# Patient Record
Sex: FEMALE | Race: OTHER | HISPANIC OR LATINO | Employment: UNEMPLOYED | URBAN - METROPOLITAN AREA
[De-identification: names, ages, dates, MRNs, and addresses within clinical notes are randomized per-mention and may not be internally consistent; named-entity substitution may affect disease eponyms.]

---

## 2018-01-09 ENCOUNTER — HOSPITAL ENCOUNTER (EMERGENCY)
Facility: HOSPITAL | Age: 33
Discharge: HOME | End: 2018-01-09
Attending: EMERGENCY MEDICINE | Admitting: EMERGENCY MEDICINE

## 2018-01-09 VITALS
WEIGHT: 229.6 LBS | OXYGEN SATURATION: 100 % | RESPIRATION RATE: 18 BRPM | HEART RATE: 90 BPM | SYSTOLIC BLOOD PRESSURE: 145 MMHG | DIASTOLIC BLOOD PRESSURE: 67 MMHG | TEMPERATURE: 98.2 F

## 2018-01-09 DIAGNOSIS — R00.2 PALPITATIONS: Primary | ICD-10-CM

## 2018-01-09 LAB
ANION GAP BLD CALC-SCNC: 16 MMOL/L (ref 4–13)
BUN BLD-MCNC: 7 MG/DL (ref 5–25)
CA-I BLD-SCNC: 1.2 MMOL/L (ref 1.12–1.32)
CHLORIDE BLD-SCNC: 104 MMOL/L (ref 100–108)
CREAT BLD-MCNC: 0.8 MG/DL (ref 0.6–1.3)
GFR SERPL CREATININE-BSD FRML MDRD: 96 ML/MIN/1.73SQ M
GLUCOSE SERPL-MCNC: 104 MG/DL (ref 65–140)
HCT VFR BLD CALC: 33 % (ref 34.8–46.1)
HGB BLDA-MCNC: 11.2 G/DL (ref 11.5–15.4)
PCO2 BLD: 25 MMOL/L (ref 21–32)
POTASSIUM BLD-SCNC: 3.8 MMOL/L (ref 3.5–5.3)
SODIUM BLD-SCNC: 140 MMOL/L (ref 136–145)
SPECIMEN SOURCE: ABNORMAL

## 2018-01-09 PROCEDURE — 93005 ELECTROCARDIOGRAM TRACING: CPT

## 2018-01-09 PROCEDURE — 99285 EMERGENCY DEPT VISIT HI MDM: CPT

## 2018-01-09 PROCEDURE — 85014 HEMATOCRIT: CPT

## 2018-01-09 PROCEDURE — 80047 BASIC METABLC PNL IONIZED CA: CPT

## 2018-01-10 LAB
ATRIAL RATE: 86 BPM
P AXIS: 65 DEGREES
PR INTERVAL: 146 MS
QRS AXIS: 46 DEGREES
QRSD INTERVAL: 92 MS
QT INTERVAL: 362 MS
QTC INTERVAL: 433 MS
T WAVE AXIS: 46 DEGREES
VENTRICULAR RATE: 86 BPM

## 2018-01-10 NOTE — ED NOTES
Once patient brought to room 30, patient walked into hallway, laughing and smiling with family        Clifford Cisneros RN  01/09/18 9259

## 2018-01-10 NOTE — ED PROVIDER NOTES
History  Chief Complaint   Patient presents with    Palpitations     pt c/o heart palpatations for the past hour; pt denies any SOB; pt denies any N/V/D     Symptoms resolved  Happens intermittently  History provided by:  Patient  Palpitations   Palpitations quality:  Fast  Duration:  2 hours  Timing:  Intermittent  Progression:  Unchanged  Chronicity:  New  Context: not caffeine and not nicotine    Relieved by:  Nothing  Worsened by:  Nothing  Ineffective treatments:  None tried  Associated symptoms: no back pain, no chest pain, no cough, no diaphoresis, no dizziness, no hemoptysis, no lower extremity edema, no nausea, no numbness, no shortness of breath, no vomiting and no weakness        None       Past Medical History:   Diagnosis Date    Asthma        Past Surgical History:   Procedure Laterality Date     SECTION         History reviewed  No pertinent family history  I have reviewed and agree with the history as documented  Social History   Substance Use Topics    Smoking status: Never Smoker    Smokeless tobacco: Never Used    Alcohol use No        Review of Systems   Constitutional: Negative for chills, diaphoresis and fever  Respiratory: Negative for cough, hemoptysis, chest tightness and shortness of breath  Cardiovascular: Positive for palpitations  Negative for chest pain and leg swelling  Gastrointestinal: Negative for abdominal pain, nausea and vomiting  Musculoskeletal: Negative for back pain and neck pain  Skin: Negative for pallor  Neurological: Negative for dizziness, syncope, facial asymmetry, speech difficulty, weakness, light-headedness and numbness  All other systems reviewed and are negative        Physical Exam  ED Triage Vitals [18 2209]   Temperature Pulse Respirations Blood Pressure SpO2   98 2 °F (36 8 °C) 90 18 145/67 100 %      Temp Source Heart Rate Source Patient Position - Orthostatic VS BP Location FiO2 (%)   Oral Monitor Sitting Right arm --      Pain Score       6           Orthostatic Vital Signs  Vitals:    01/09/18 2209   BP: 145/67   Pulse: 90   Patient Position - Orthostatic VS: Sitting       Physical Exam   Constitutional: She is oriented to person, place, and time  She appears well-developed and well-nourished  Non-toxic appearance  She does not have a sickly appearance  She does not appear ill  No distress  Laughing with the others in her room   HENT:   Head: Normocephalic and atraumatic  Mouth/Throat: Oropharynx is clear and moist    Eyes: EOM are normal  Pupils are equal, round, and reactive to light  Neck: Normal range of motion  Neck supple  No JVD present  Cardiovascular: Normal rate, regular rhythm, S1 normal, S2 normal, normal heart sounds, intact distal pulses and normal pulses  Exam reveals no gallop and no friction rub  No murmur heard  Pulmonary/Chest: Effort normal and breath sounds normal  No respiratory distress  She has no wheezes  She has no rales  She exhibits no tenderness  Abdominal: Soft  Bowel sounds are normal  She exhibits no distension  There is no tenderness  There is no rebound and no guarding  Neurological: She is alert and oriented to person, place, and time  She has normal strength  No cranial nerve deficit or sensory deficit  Skin: Skin is warm and dry  No rash noted  She is not diaphoretic  No pallor  Nursing note and vitals reviewed        ED Medications  Medications - No data to display    Diagnostic Studies  Results Reviewed     Procedure Component Value Units Date/Time    POCT Chem 8+ [[id number]]  (Abnormal) Collected:  01/09/18 2234    Lab Status:  Final result Updated:  01/09/18 2239     SODIUM, I-STAT 140 mmol/l      Potassium, i-STAT 3 8 mmol/L      Chloride, istat 104 mmol/L      CO2, i-STAT 25 mmol/L      Anion Gap, Istat 16 (H) mmol/L      Calcium, Ionized i-STAT 1 20 mmol/L      BUN, I-STAT 7 mg/dl      Creatinine, i-STAT 0 8 mg/dl      eGFR 96 ml/min/1 73sq m      Glucose, i-STAT 104 mg/dl      Hct, i-STAT 33 (L) %      Hgb, i-STAT 11 2 (L) g/dl      Specimen Type VENOUS                 No orders to display              Procedures  ECG 12 Lead Documentation  Date/Time: 1/9/2018 10:23 PM  Performed by: Jcarlos Lundberg by: Glen Mayes     Indications / Diagnosis:  Palpitations  ECG reviewed by me, the ED Provider: yes    Patient location:  Bedside  Rate:     ECG rate:  86    ECG rate assessment: normal    Rhythm:     Rhythm: sinus rhythm    Ectopy:     Ectopy: PAC    QRS:     QRS axis:  Normal    QRS intervals:  Normal  ST segments:     ST segments:  Normal  T waves:     T waves: normal             Phone Contacts  ED Phone Contact    ED Course  ED Course                                MDM  Number of Diagnoses or Management Options  Palpitations:   Diagnosis management comments: Palpitations - Will check EKG to assess arrhythmia, istat to r/o lyte abnormality  Amount and/or Complexity of Data Reviewed  Tests in the medicine section of CPT®: ordered and reviewed      CritCare Time    Disposition  Final diagnoses:   Palpitations     Time reflects when diagnosis was documented in both MDM as applicable and the Disposition within this note     Time User Action Codes Description Comment    1/9/2018 10:40 PM Rishabh Kingston 48 [R00 2] Palpitations       ED Disposition     ED Disposition Condition Comment    Discharge  Di Colon discharge to home/self care  Condition at discharge: Good        Follow-up Information     Follow up With Specialties Details Why [address]    [address]  [phone]          Patient's Medications    No medications on file     No discharge procedures on file      ED Provider  Electronically Signed by           Bharti Bellamy DO  01/09/18 4667

## 2018-01-10 NOTE — DISCHARGE INSTRUCTIONS
Palpitaciones cardíacas   LO QUE NECESITA SABER:   Las palpitaciones cardíacas son sensaciones que se perciben wes aceleraciones, chris, latidos o aleteos del corazón  También podría sentir latidos adicionales, que [place] corazón no late por un corto periodo de tiempo o que se [employer] latidos  Puede percibir estas sensaciones en el pecho, la garganta o el vishal  Pueden presentarse cuando está sentado, de pie o acostado  Las palpitaciones cardíacas pueden causar temor; sin embargo, generalmente no se deben a un problema importante  INSTRUCCIONES SOBRE [facility]:   Llame al 911 o pídale a alguien más que llame en cualquiera de los siguientes casos:   · Usted tiene alguno de los siguientes signos de un ataque cardíaco:      ¨ Estornudos, presión, o dolor en [place] pecho que dura mas de 5 minutos o regresa  ¨ Malestar o dolor en [place] espalda, vishal, mandíbula, abdomen, o brazo     ¨ Dificultad para respirar    ¨ Náuseas o vómito    ¨ Siente un desvanecimiento o tiene sudores fríos especialmente en el pecho o dificultad para respirar  · Usted tiene alguno de los siguientes signos de derrame cerebral:      ¨ Adormecimiento o caída de un lado de [place] mainor     ¨ Debilidad en un brazo o claude pierna    ¨ Confusión o debilidad para hablar    ¨ Mareos o dolor de tanvi intenso, o pérdida de la visión  · Usted se desmaya o pierde el conocimiento  Regrese a la paige de emergencias si:   · Angel palpitaciones ocurren con más frecuencia o son más intensas  Pregúntele a [place] Roshan Freed vitaminas y minerales son adecuados para usted  · Usted tiene nueva inflamación en angel pies o tobillos o esta ha empeorado  · Usted tiene preguntas o inquietudes acerca de [place] condición o cuidado  Acuda a angel consultas de control con [place] médico según le indicaron  Es posible que tenga que programar claude elma de seguimiento con un cardiólogo   Tang vez deba hacerse exámenes para determinar si sufre problemas cardíacos que le causan las palpitaciones  Anote angel preguntas para que se acuerde de hacerlas torsten angel visitas  Mantenga un registro:  Mariano cuándo angel palpitaciones comienzan y terminan, qué estaba usted haciendo cuando comenzaron y angel síntomas  Mantenga un registro de lo que usted comió o tomó torsten las horas antes de angel palpitaciones  Incluya todo lo que aparentemente le ayudó a que angel síntomas mejoraran wes acostarse o contener [place] respiración  [employer] ayudará a usted y a [place] médico a saber qué le provoca las palpitaciones  Lleve el registro con usted a angel citas de seguimiento  Ayude a evitar las palpitaciones cardíacas:   · Controle el estrés y la ansiedad  Encuentre formas de [place], wes escuchar música, meditar o hacer yoga  El ejercicio también puede ayudar a disminuir el estrés y la ansiedad  Hable con [place] persona de confianza sobre el estrés o la ansiedad que padece  También puede hablar con un psicoterapeuta  · Duerma lo suficiente cada la noche  Pregunte a [place] médico cuánto debería dormir usted cada noche  · No tome bebidas con cafeína o alcohol   Manuel Larsen y el alcohol pueden hacer que angel palpitaciones empeoren  Los refrescos, el café, el té, el chocolate y las bebidas energizantes contienen cafeína  · No fume  La nicotina y otros químicos en los cigarrillos y cigarros podrían provocar daño a [place] Janas Gosselin y a angel vasos sanguíneos  Pida información a [place] médico si usted actualmente fuma y necesita ayuda para dejar de fumar  Los cigarrillos electrónicos o tabaco sin humo todavía contienen nicotina  Consulte con [place] médico antes de [employer]  · No use drogas ilegales  Hable con [place] médico si consume drogas ilegales y Ileene Eileen  © 2017 [address] [employer] Information is for End User's use only and may not be sold, redistributed or otherwise used for commercial purposes   All illustrations and images included in CareNotes® are the copyrighted property of A  D A M , Daksha Ma  Esta información es sólo para uso en educación  [place] intención no es darle un consejo médico sobre enfermedades o tratamientos  Colsulte con [place] Gaylyn Harsh farmacéutico antes de seguir cualquier régimen médico para saber si es seguro y efectivo para usted

## 2019-01-03 ENCOUNTER — HOSPITAL ENCOUNTER (EMERGENCY)
Facility: HOSPITAL | Age: 34
Discharge: HOME | End: 2019-01-03
Attending: EMERGENCY MEDICINE | Admitting: EMERGENCY MEDICINE

## 2019-01-03 VITALS
OXYGEN SATURATION: 100 % | TEMPERATURE: 98 F | DIASTOLIC BLOOD PRESSURE: 73 MMHG | SYSTOLIC BLOOD PRESSURE: 134 MMHG | RESPIRATION RATE: 17 BRPM | WEIGHT: 226.4 LBS | HEART RATE: 89 BPM

## 2019-01-03 DIAGNOSIS — J06.9 UPPER RESPIRATORY INFECTION: ICD-10-CM

## 2019-01-03 DIAGNOSIS — L30.9 DERMATITIS: Primary | ICD-10-CM

## 2019-01-03 PROCEDURE — 99282 EMERGENCY DEPT VISIT SF MDM: CPT

## 2019-01-03 RX ORDER — SOFT LENS DISINFECTANT
SOLUTION, NON-ORAL MISCELLANEOUS AS NEEDED
Qty: 1 EACH | Refills: 0 | Status: SHIPPED | OUTPATIENT
Start: 2019-01-03 | End: 2020-06-26 | Stop reason: ALTCHOICE

## 2019-01-03 RX ORDER — ALBUTEROL SULFATE 90 UG/1
2 AEROSOL, METERED RESPIRATORY (INHALATION) EVERY 4 HOURS PRN
Qty: 1 INHALER | Refills: 0 | Status: SHIPPED | OUTPATIENT
Start: 2019-01-03 | End: 2020-06-26 | Stop reason: ALTCHOICE

## 2019-01-03 RX ORDER — DIAPER,BRIEF,INFANT-TODD,DISP
EACH MISCELLANEOUS 2 TIMES DAILY
Qty: 30 G | Refills: 0 | Status: SHIPPED | OUTPATIENT
Start: 2019-01-03 | End: 2019-04-22

## 2019-01-03 NOTE — ED PROVIDER NOTES
History  Chief Complaint   Patient presents with    Nasal Congestion     started today with cough and nasal congestion  Son and daughter sick with similar s/s  Tylenol "this morning"  Diffuse rash to bilateral arms and right side of neck  Pruritic  No airway compromise    Rash     43-year-old female with history of asthma, presents for evaluation of mild, productive cough and nasal congestion for the past 2 days  Patient reports that she has been around her kids who also have similar symptoms  Patient reports that she also feels chest tightness on occasion  States that she has taken Tylenol without much relief as well as Benadryl  Patient states that she also has been noticing a rash over her bilateral forearms and neck for the past few days  States that she is unsure if it is due to 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  She denies any new soaps, lotions, detergents, foods, environmental or animal exposures  She has not applied anything to the area  Denies any fever, chills, nausea vomiting, difficulty breathing, chest pain or shortness of breath  Patient is not a smoker  None       Past Medical History:   Diagnosis Date    Asthma        Past Surgical History:   Procedure Laterality Date     SECTION         History reviewed  No pertinent family history  I have reviewed and agree with the history as documented  Social History   Substance Use Topics    Smoking status: Never Smoker    Smokeless tobacco: Never Used    Alcohol use No        Review of Systems   Constitutional: Negative for chills and fever  HENT: Positive for congestion  Negative for ear discharge, ear pain, sinus pressure and sore throat  Respiratory: Positive for cough and chest tightness  Negative for shortness of breath and wheezing  Cardiovascular: Negative for chest pain  Gastrointestinal: Negative for abdominal pain, constipation, diarrhea, nausea and vomiting  Musculoskeletal: Negative for myalgias     Skin: Positive for rash  Physical Exam  Physical Exam   Constitutional: She is oriented to person, place, and time  She appears well-developed and well-nourished  She is cooperative  No distress  HENT:   Head: Normocephalic and atraumatic  Right Ear: External ear normal    Left Ear: External ear normal    Mouth/Throat: Oropharynx is clear and moist  No oropharyngeal exudate  Eyes: Pupils are equal, round, and reactive to light  Conjunctivae and EOM are normal    Neck: Normal range of motion  Neck supple  Cardiovascular: Normal rate and normal heart sounds  No murmur heard  Pulmonary/Chest: Effort normal and breath sounds normal  No respiratory distress  She has no rhonchi  She has no rales  She exhibits no tenderness  Musculoskeletal: Normal range of motion  Neurological: She is alert and oriented to person, place, and time  Skin: Skin is warm  Rash noted  She is not diaphoretic  No erythema  Red macular raised rash noted over antecubital region bilaterally  Macular rash noted over right neck region  No vesicles or pustules  No drainage from rash  Psychiatric: She has a normal mood and affect  Nursing note and vitals reviewed        Vital Signs  ED Triage Vitals [01/03/19 1222]   Temperature Pulse Respirations Blood Pressure SpO2   98 °F (36 7 °C) 89 17 134/73 100 %      Temp src Heart Rate Source Patient Position - Orthostatic VS BP Location FiO2 (%)   -- Monitor -- -- --      Pain Score       No Pain           Vitals:    01/03/19 1222   BP: 134/73   Pulse: 89       Visual Acuity      ED Medications  Medications - No data to display    Diagnostic Studies  Results Reviewed     None                 No orders to display              Procedures  Procedures       Phone Contacts  ED Phone Contact    ED Course                               MDM  Number of Diagnoses or Management Options  Dermatitis:   Upper respiratory infection:   Diagnosis management comments: 49-year-old female presents for evaluation of nasal congestion, cough and a rash  Will advise supportive care for cough and nasal congestion and given hydrocortisone cream for the rash  Advised follow up with family care provider if symptoms do not improve  CritCare Time    Disposition  Final diagnoses:   Dermatitis   Upper respiratory infection     Time reflects when diagnosis was documented in both MDM as applicable and the Disposition within this note     Time User Action Codes Description Comment    1/3/2019  1:35 PM Tomi Moe Add [L30 9] Dermatitis     1/3/2019  1:36 PM Tomi Moe Add [J06 9] Upper respiratory infection       ED Disposition     ED Disposition Condition Comment    Discharge  [facility] discharge to home/self care  Condition at discharge: Stable        Follow-up Information     Follow up With Specialties Details Why [address] Family Medicine Schedule an appointment as soon as possible for a visit Follow up for further evaluation [phone] [address] [phone]            Discharge Medication List as of 1/3/2019  1:39 PM      START taking these medications    Details   albuterol (PROVENTIL HFA,VENTOLIN HFA) 90 mcg/act inhaler Inhale 2 puffs every 4 (four) hours as needed for wheezing, Starting Thu 1/3/2019, Print      hydrocortisone 1 % ointment Apply topically 2 (two) times a day for 14 days, Starting Thu 1/3/2019, Until Thu 1/17/2019, Print      Respiratory Therapy Supplies (NEBULIZER) Dorothe Earing by Does not apply route as needed (wheezing and shortness of breath), Starting Thu 1/3/2019, Print           No discharge procedures on file      ED Provider  Electronically Signed by           Elpidio Gomez PA-C  01/04/19 7146

## 2019-01-03 NOTE — DISCHARGE INSTRUCTIONS
Infección respiratoria superior   LO QUE NECESITA SABER:   Angelica infección respiratoria superior también se conoce wes el resfriado común  Melanie es angelica infección que puede afectar [place] nariz, garganta, oídos y los senos frontales  Para personas saludables, el resfriado común generalmente no es grave y no requiere tratamiento especial  Los síntomas del resfriado generalmente son [place] graves torsten los primeros 3 a 5 días  [address] en 7 a 14 días  Puede que usted siga tosiendo por 2 a 3 semanas  Los resfriados son provocados por virus y no mejoran con antibióticos  INSTRUCCIONES SOBRE [facility]:   Busque atención médica de inmediato si:   · Usted tiene dolor torácico y dificultad para respirar  Pregúntele a [place] Chinyere Pel vitaminas y minerales son adecuados para usted  · Usted tiene fiebre de más de 102ºF [facility])  · S[place]land garganta irritada empeora o usted ve manchas hilario o sandor en [place] garganta  · Silva síntomas empeoran después de 3 a 5 días o [place]land resfriado no mejora en 14 días  · Usted tiene sarpullido en alguna parte de [place] piel  · Usted tiene bultos grandes y sensible en s[place] vishal    · Usted tiene secreción espesa de color glenis o amarillo proveniente de [place] nariz  · Usted expectora mucosidad de color amarillo, glenis o con [place]  · Usted vomita por más de 24 horas y no puede retener líquidos en el estómago  · Usted tiene un grave dolor de oído  · Usted tiene preguntas o inquietudes acerca de [place] condición o cuidado  Medicamentos:  Es posible que usted necesite alguno de los siguientes:  · Los descongestionantes  ayudan a reducir la congestión nasal y Shelby Ganser a respirar más fácilmente  Si charlie pastillas descongestionantes, pueden causarle agitación o problemas para dormir  No use aerosol descongestionante por más de unos cuantos días  · Los jarabes para la tos  ayudan a reducir la tos   Pregúntele a [place] médico cuál tipo de medicamento para la tos es mejor para usted  · AINEs (Analgésicos antiinflamatorios no esteroides) wes el ibuprofeno, ayudan a disminuir la inflamación, el dolor y la [place]  Los AINEs pueden causar sangrado estomacal o problemas renales en ciertas personas  Si usted charlie un medicamento anticoagulante, siempre pregúntele a [place] médico si los LOWELL son seguros para usted  Siempre ginette la etiqueta de susan medicamento y [place] instrucciones  · Acetaminofeno:  kit el dolor y baja la fiebre  Está disponible sin receta médica  Pregunte la cantidad y la frecuencia con que debe tomarlos  [facility] 645  Ginette las etiquetas de todos los demás medicamentos que esté usando para saber si también contienen acetaminofén, o pregunte a [place] médico o farmacéutico  El acetaminofén puede causar daño en el hígado cuando no se charlie de forma correcta  No use más de 4 gramos (4000 miligramos) en total de acetaminofeno en un día  · [place] angel medicamentos wes se le haya indicado  Consulte con [place] médico si usted jagdeep que [place] medicamento no le está ayudando o si presenta efectos secundarios  Infórmele si es alérgico a cualquier medicamento  Mantenga claude lista actualizada de los [place], las vitaminas y los productos herbales que charlie  Incluya los siguientes datos de los medicamentos: cantidad, frecuencia y motivo de administración  Traiga con usted la lista o los envases de la píldoras a angel citas de seguimiento  Lleve la lista de los medicamentos con usted en kellee de claude emergencia  Acuda a angel consultas de control con [place] médico según le indicaron  Anote angel preguntas para que se acuerde de hacerlas torsten angel visitas  Cuidados personales:   · Descanse el mayor tiempo posible  Empiece a hacer un poco más día a día  · [employer] líquidos wes se le indique  Los líquidos le ayudarán a aflojar y disolver la mucosidad para que la pueda expectorar  Los líquidos ayudarán a evitar la deshidratación   Los líquidos que ayudan a prevenir la deshidratación pueden ser [place], jugo de fruta y caldo  No tome líquidos que contienen cafeína  La cafeína puede aumentar el riesgo de deshidratación  Pregúntele a [place] médico cuál es la cantidad de líquido que necesita ingerir a diario  · Alivie el dolor de garganta  Maggie gárgaras de agua tibia con sal  [place] ayuda a aliviar el dolor de garganta  Prepare agua salina disolviendo ¼ de cucharada de sal a 1 taza de agua tibia  Usted puede también chupar dulces duros o pastillas para la garganta  Usted puede usar aerosol para el dolor de garganta  · Use un humidificador o vaporizador  Use un humidificador de vivian frío o un vaporizador para elevar la humedad en [place] casa  [place] podría ayudarle a respirar más fácilmente y al mismo tiempo disminuir la tos  · Use gotas nasales de solución salina wes se le haya indicado  Estas ayudan a [place]  · Aplique vaselina en la parte externa alrededor de las fosas nasales  Esta puede disminuir la irritación de sonarse la nariz  · No fume  La nicotina y otros químicos en los cigarrillos y cigarros pueden empeorar angel síntomas  También pueden causar infecciones wes la bronquitis o la neumonía  Pida información a [place] médico si usted actualmente fuma y necesita ayuda para dejar de fumar  Los cigarrillos electrónicos o tabaco sin humo todavía contienen nicotina  Consulte con s[place] médico antes de [employer]  Evite transmitir [place]land resfriado a los demás:   · Trate de mantenerse alejado de otras personas torsten los primeros 2 a 3 días de [place] resfriado cuando éste es más fácil de transmitir  · No comparta alimentos o bebidas  · No comparta toallas de mano con otros miembros de olive en el hogar  · [employer] frecuentemente, especialmente después de sonarse la nariz  Bharat la espalda a otras personas y cúbrase la boca y la nariz con un pañuelo desechable cuando estornuda o tose         © 2017 [address] [employer] Information is for End User's use only and may not be sold, redistributed or otherwise used for commercial purposes  All illustrations and images included in CareNotes® are the copyrighted property of A CHENG A M , Inc  or Zac Ma  Esta información es sólo para uso en educación  Vo intención no es darle un consejo médico sobre enfermedades o tratamientos  Colsulte con vo Verlon Tae farmacéutico antes de seguir cualquier régimen médico para saber si es seguro y efectivo para usted  Dermatitis   LO QUE NECESITA SABER:   La dermatitis es claude inflamación cutánea  Usted puede tener un sarpullido con picor, enrojecimiento o inflamación  Podría también tener protuberancias o ampollas que elisha costra o supuran un líquido lam  La dermatitis puede ser causada por alérgenos wes ácaros, caspa de los [place], polen y ciertos alimentos  También se puede desarrollar cuando algo entra en contacto con la piel y la irrita o provoca claude reacción alérgica  Unos ejemplos son [place] Mace, látex y la hiedra venenosa  Maria M Ghazi [facility]:   Llame al 911 en kellee de presentar alguno de los siguientes síntomas de anafilaxia:   · Dificultad repentina para respirar    · Inflamación y estrechez en la garganta    · Mareos, desvanecimientos, desmayos o confusión  Regrese a la paige de emergencias si:   · Usted tiene fiebre o nota edd rojizas subiendo por vo Vonita Handing  · Vo sarpullido se ve más inflamado, romano o caliente  Pregúntele a vo Chinyere Pel vitaminas y minerales son adecuados para usted  · Las ampollas en vo piel supuran un líquido linares o amarillento o le sale claude secreción maloliente  · Vo sarpullido se propaga o no mejora aún después del tratamiento  · Usted tiene preguntas o inquietudes acerca de vo condición o cuidado  Medicamentos:   · Medicamentos,  ayudan a disminuir el sarpullido y la inflamación o para tratar la infección bacteriana   Los [employer] ser administrados en crema tópica, inyección o píldora  · [place] angel medicamentos wes se le haya indicado  Consulte con vo médico si usted jagdeep que vo medicamento no le está ayudando o si presenta efectos secundarios  Infórmele si es alérgico a algún medicamento  Mantenga claude lista actualizada de los [place], las vitaminas y los productos herbales que charlie  Incluya los siguientes datos de los medicamentos: cantidad, frecuencia y motivo de administración  Traiga con usted la lista o los envases de la píldoras a angel citas de seguimiento  Lleve la lista de los medicamentos con usted en kellee de claude emergencia  Aplique claude compresa fría a vo sarpullido:  [place] ayudará a aliviar vo piel  Mantenga vo piel húmeda:  Frote crema o loción sin perfume en vo piel para impedir la resequedad y comezón  Maggie esto tan pronto termine de bañarse o ducharse con agua templada cuando vo piel aún esté mojada  Evite los irritantes de la piel:  No use productos que le irriten la piel, wes el [place], productos para el BRIAN, jabones y limpiadores  Use productos que no contengan perfume ni tintes  Acuda a angel consultas de control con vo médico según le indicaron  Anote angel preguntas para que se acuerde de hacerlas torsten angel visitas  © 2017 [address] [employer] Information is for End User's use only and may not be sold, redistributed or otherwise used for commercial purposes  All illustrations and images included in CareNotes® are the copyrighted property of A D A M , Inc  or Zac Ma  Esta información es sólo para uso en educación  Vo intención no es darle un consejo médico sobre enfermedades o tratamientos  Colsulte con vo Gaylyn Harsh farmacéutico antes de seguir cualquier régimen médico para saber si es seguro y efectivo para usted

## 2019-04-22 ENCOUNTER — HOSPITAL ENCOUNTER (EMERGENCY)
Facility: HOSPITAL | Age: 34
Discharge: HOME | End: 2019-04-22
Attending: EMERGENCY MEDICINE

## 2019-04-22 VITALS
SYSTOLIC BLOOD PRESSURE: 132 MMHG | RESPIRATION RATE: 20 BRPM | TEMPERATURE: 98 F | WEIGHT: 229.28 LBS | HEART RATE: 98 BPM | DIASTOLIC BLOOD PRESSURE: 84 MMHG | OXYGEN SATURATION: 99 %

## 2019-04-22 DIAGNOSIS — R68.89 FLU-LIKE SYMPTOMS: Primary | ICD-10-CM

## 2019-04-22 PROCEDURE — 99283 EMERGENCY DEPT VISIT LOW MDM: CPT | Performed by: EMERGENCY MEDICINE

## 2019-04-22 PROCEDURE — 99283 EMERGENCY DEPT VISIT LOW MDM: CPT

## 2019-04-22 RX ORDER — GUAIFENESIN 100 MG/5ML
400 SYRUP ORAL 3 TIMES DAILY PRN
Qty: 120 ML | Refills: 0 | Status: SHIPPED | OUTPATIENT
Start: 2019-04-22 | End: 2019-05-02

## 2019-04-22 RX ORDER — ONDANSETRON 4 MG/1
4 TABLET, ORALLY DISINTEGRATING ORAL EVERY 8 HOURS PRN
Qty: 20 TABLET | Refills: 0 | Status: SHIPPED | OUTPATIENT
Start: 2019-04-22 | End: 2020-06-26 | Stop reason: ALTCHOICE

## 2019-04-22 RX ORDER — IBUPROFEN 600 MG/1
600 TABLET ORAL EVERY 6 HOURS PRN
Qty: 30 TABLET | Refills: 0 | Status: SHIPPED | OUTPATIENT
Start: 2019-04-22 | End: 2020-06-26 | Stop reason: ALTCHOICE

## 2020-06-26 ENCOUNTER — HOSPITAL ENCOUNTER (EMERGENCY)
Facility: HOSPITAL | Age: 35
Discharge: HOME | End: 2020-06-26
Attending: EMERGENCY MEDICINE | Admitting: EMERGENCY MEDICINE

## 2020-06-26 ENCOUNTER — APPOINTMENT (EMERGENCY)
Dept: CT IMAGING | Facility: HOSPITAL | Age: 35
End: 2020-06-26

## 2020-06-26 VITALS
TEMPERATURE: 98.5 F | RESPIRATION RATE: 16 BRPM | HEART RATE: 76 BPM | OXYGEN SATURATION: 99 % | WEIGHT: 250.22 LBS | SYSTOLIC BLOOD PRESSURE: 122 MMHG | DIASTOLIC BLOOD PRESSURE: 58 MMHG

## 2020-06-26 DIAGNOSIS — N20.0 KIDNEY STONE ON LEFT SIDE: Primary | ICD-10-CM

## 2020-06-26 LAB
ANION GAP SERPL CALCULATED.3IONS-SCNC: 9 MMOL/L (ref 4–13)
BACTERIA UR QL AUTO: ABNORMAL /HPF
BASOPHILS # BLD AUTO: 0.02 THOUSANDS/ΜL (ref 0–0.1)
BASOPHILS NFR BLD AUTO: 0 % (ref 0–1)
BILIRUB UR QL STRIP: NEGATIVE
BUN SERPL-MCNC: 10 MG/DL (ref 5–25)
CALCIUM SERPL-MCNC: 8.6 MG/DL (ref 8.3–10.1)
CHLORIDE SERPL-SCNC: 103 MMOL/L (ref 100–108)
CLARITY UR: ABNORMAL
CO2 SERPL-SCNC: 25 MMOL/L (ref 21–32)
COLOR UR: YELLOW
CREAT SERPL-MCNC: 0.91 MG/DL (ref 0.6–1.3)
EOSINOPHIL # BLD AUTO: 0 THOUSAND/ΜL (ref 0–0.61)
EOSINOPHIL NFR BLD AUTO: 0 % (ref 0–6)
ERYTHROCYTE [DISTWIDTH] IN BLOOD BY AUTOMATED COUNT: 19.9 % (ref 11.6–15.1)
EXT PREG TEST URINE: NEGATIVE
EXT. CONTROL ED NAV: NORMAL
GFR SERPL CREATININE-BSD FRML MDRD: 81 ML/MIN/1.73SQ M
GLUCOSE SERPL-MCNC: 124 MG/DL (ref 65–140)
GLUCOSE UR STRIP-MCNC: NEGATIVE MG/DL
HCT VFR BLD AUTO: 31.3 % (ref 34.8–46.1)
HGB BLD-MCNC: 9 G/DL (ref 11.5–15.4)
HGB UR QL STRIP.AUTO: ABNORMAL
IMM GRANULOCYTES # BLD AUTO: 0.01 THOUSAND/UL (ref 0–0.2)
IMM GRANULOCYTES NFR BLD AUTO: 0 % (ref 0–2)
KETONES UR STRIP-MCNC: NEGATIVE MG/DL
LEUKOCYTE ESTERASE UR QL STRIP: NEGATIVE
LYMPHOCYTES # BLD AUTO: 1.27 THOUSANDS/ΜL (ref 0.6–4.47)
LYMPHOCYTES NFR BLD AUTO: 21 % (ref 14–44)
MCH RBC QN AUTO: 20.5 PG (ref 26.8–34.3)
MCHC RBC AUTO-ENTMCNC: 28.8 G/DL (ref 31.4–37.4)
MCV RBC AUTO: 71 FL (ref 82–98)
MONOCYTES # BLD AUTO: 0.24 THOUSAND/ΜL (ref 0.17–1.22)
MONOCYTES NFR BLD AUTO: 4 % (ref 4–12)
NEUTROPHILS # BLD AUTO: 4.55 THOUSANDS/ΜL (ref 1.85–7.62)
NEUTS SEG NFR BLD AUTO: 75 % (ref 43–75)
NITRITE UR QL STRIP: NEGATIVE
NON-SQ EPI CELLS URNS QL MICRO: ABNORMAL /HPF
NRBC BLD AUTO-RTO: 0 /100 WBCS
PH UR STRIP.AUTO: 6 [PH] (ref 4.5–8)
PLATELET # BLD AUTO: 282 THOUSANDS/UL (ref 149–390)
PMV BLD AUTO: 10.9 FL (ref 8.9–12.7)
POTASSIUM SERPL-SCNC: 3.6 MMOL/L (ref 3.5–5.3)
PROT UR STRIP-MCNC: NEGATIVE MG/DL
RBC # BLD AUTO: 4.4 MILLION/UL (ref 3.81–5.12)
RBC #/AREA URNS AUTO: ABNORMAL /HPF
SODIUM SERPL-SCNC: 137 MMOL/L (ref 136–145)
SP GR UR STRIP.AUTO: >=1.03 (ref 1–1.03)
UROBILINOGEN UR QL STRIP.AUTO: 0.2 E.U./DL
WBC # BLD AUTO: 6.09 THOUSAND/UL (ref 4.31–10.16)
WBC #/AREA URNS AUTO: ABNORMAL /HPF

## 2020-06-26 PROCEDURE — 36415 COLL VENOUS BLD VENIPUNCTURE: CPT | Performed by: EMERGENCY MEDICINE

## 2020-06-26 PROCEDURE — 99284 EMERGENCY DEPT VISIT MOD MDM: CPT | Performed by: EMERGENCY MEDICINE

## 2020-06-26 PROCEDURE — 96361 HYDRATE IV INFUSION ADD-ON: CPT

## 2020-06-26 PROCEDURE — 74176 CT ABD & PELVIS W/O CONTRAST: CPT

## 2020-06-26 PROCEDURE — 99284 EMERGENCY DEPT VISIT MOD MDM: CPT

## 2020-06-26 PROCEDURE — 96375 TX/PRO/DX INJ NEW DRUG ADDON: CPT

## 2020-06-26 PROCEDURE — 80048 BASIC METABOLIC PNL TOTAL CA: CPT | Performed by: EMERGENCY MEDICINE

## 2020-06-26 PROCEDURE — 96374 THER/PROPH/DIAG INJ IV PUSH: CPT

## 2020-06-26 PROCEDURE — 81025 URINE PREGNANCY TEST: CPT | Performed by: EMERGENCY MEDICINE

## 2020-06-26 PROCEDURE — 85025 COMPLETE CBC W/AUTO DIFF WBC: CPT | Performed by: EMERGENCY MEDICINE

## 2020-06-26 PROCEDURE — 81001 URINALYSIS AUTO W/SCOPE: CPT

## 2020-06-26 RX ORDER — ONDANSETRON 4 MG/1
4 TABLET, ORALLY DISINTEGRATING ORAL EVERY 8 HOURS PRN
Qty: 10 TABLET | Refills: 0 | Status: SHIPPED | OUTPATIENT
Start: 2020-06-26 | End: 2020-08-30 | Stop reason: ALTCHOICE

## 2020-06-26 RX ORDER — KETOROLAC TROMETHAMINE 30 MG/ML
15 INJECTION, SOLUTION INTRAMUSCULAR; INTRAVENOUS ONCE
Status: COMPLETED | OUTPATIENT
Start: 2020-06-26 | End: 2020-06-26

## 2020-06-26 RX ORDER — ONDANSETRON 2 MG/ML
4 INJECTION INTRAMUSCULAR; INTRAVENOUS ONCE
Status: COMPLETED | OUTPATIENT
Start: 2020-06-26 | End: 2020-06-26

## 2020-06-26 RX ORDER — OXYCODONE HYDROCHLORIDE AND ACETAMINOPHEN 5; 325 MG/1; MG/1
1 TABLET ORAL EVERY 6 HOURS PRN
Qty: 8 TABLET | Refills: 0 | Status: SHIPPED | OUTPATIENT
Start: 2020-06-26 | End: 2020-07-06

## 2020-06-26 RX ORDER — TAMSULOSIN HYDROCHLORIDE 0.4 MG/1
0.4 CAPSULE ORAL
Qty: 10 CAPSULE | Refills: 0 | Status: SHIPPED | OUTPATIENT
Start: 2020-06-26 | End: 2020-08-30 | Stop reason: ALTCHOICE

## 2020-06-26 RX ORDER — IBUPROFEN 600 MG/1
600 TABLET ORAL EVERY 6 HOURS PRN
Qty: 30 TABLET | Refills: 0 | Status: SHIPPED | OUTPATIENT
Start: 2020-06-26 | End: 2020-08-30 | Stop reason: ALTCHOICE

## 2020-06-26 RX ADMIN — SODIUM CHLORIDE 1000 ML: 0.9 INJECTION, SOLUTION INTRAVENOUS at 04:14

## 2020-06-26 RX ADMIN — KETOROLAC TROMETHAMINE 15 MG: 30 INJECTION, SOLUTION INTRAMUSCULAR at 04:14

## 2020-06-26 RX ADMIN — ONDANSETRON 4 MG: 2 INJECTION INTRAMUSCULAR; INTRAVENOUS at 04:15

## 2020-08-30 ENCOUNTER — ANESTHESIA (OUTPATIENT)
Dept: PERIOP | Facility: HOSPITAL | Age: 35
End: 2020-08-30
Payer: COMMERCIAL

## 2020-08-30 ENCOUNTER — HOSPITAL ENCOUNTER (OUTPATIENT)
Facility: HOSPITAL | Age: 35
Setting detail: OBSERVATION
Discharge: HOME | End: 2020-08-31
Attending: EMERGENCY MEDICINE | Admitting: INTERNAL MEDICINE
Payer: COMMERCIAL

## 2020-08-30 ENCOUNTER — APPOINTMENT (OUTPATIENT)
Dept: RADIOLOGY | Facility: HOSPITAL | Age: 35
End: 2020-08-30
Payer: COMMERCIAL

## 2020-08-30 ENCOUNTER — ANESTHESIA EVENT (OUTPATIENT)
Dept: PERIOP | Facility: HOSPITAL | Age: 35
End: 2020-08-30
Payer: COMMERCIAL

## 2020-08-30 ENCOUNTER — PREP FOR PROCEDURE (OUTPATIENT)
Dept: UROLOGY | Facility: CLINIC | Age: 35
End: 2020-08-30

## 2020-08-30 ENCOUNTER — TELEPHONE (OUTPATIENT)
Dept: UROLOGY | Facility: CLINIC | Age: 35
End: 2020-08-30

## 2020-08-30 ENCOUNTER — APPOINTMENT (EMERGENCY)
Dept: CT IMAGING | Facility: HOSPITAL | Age: 35
End: 2020-08-30
Payer: COMMERCIAL

## 2020-08-30 DIAGNOSIS — N20.0 NEPHROLITHIASIS: Primary | ICD-10-CM

## 2020-08-30 DIAGNOSIS — N23 RENAL COLIC ON LEFT SIDE: ICD-10-CM

## 2020-08-30 DIAGNOSIS — N20.0 KIDNEY STONE: ICD-10-CM

## 2020-08-30 DIAGNOSIS — N12 PYELONEPHRITIS: ICD-10-CM

## 2020-08-30 DIAGNOSIS — N13.2 HYDRONEPHROSIS WITH URINARY OBSTRUCTION DUE TO URETERAL CALCULUS: ICD-10-CM

## 2020-08-30 DIAGNOSIS — E66.01 MORBID OBESITY (HCC): ICD-10-CM

## 2020-08-30 DIAGNOSIS — Z46.6 ENCOUNTER FOR ADJUSTMENT OF URETERAL STENT: ICD-10-CM

## 2020-08-30 DIAGNOSIS — N39.0 UTI (URINARY TRACT INFECTION): Primary | ICD-10-CM

## 2020-08-30 DIAGNOSIS — D64.9 ANEMIA: ICD-10-CM

## 2020-08-30 LAB
ALBUMIN SERPL BCP-MCNC: 2.9 G/DL (ref 3.5–5)
ALP SERPL-CCNC: 82 U/L (ref 46–116)
ALT SERPL W P-5'-P-CCNC: 26 U/L (ref 12–78)
ANION GAP SERPL CALCULATED.3IONS-SCNC: 5 MMOL/L (ref 4–13)
AST SERPL W P-5'-P-CCNC: 14 U/L (ref 5–45)
BACTERIA UR QL AUTO: ABNORMAL /HPF
BASOPHILS # BLD AUTO: 0.02 THOUSANDS/ΜL (ref 0–0.1)
BASOPHILS NFR BLD AUTO: 0 % (ref 0–1)
BILIRUB SERPL-MCNC: 0.16 MG/DL (ref 0.2–1)
BILIRUB UR QL STRIP: NEGATIVE
BUN SERPL-MCNC: 13 MG/DL (ref 5–25)
CALCIUM SERPL-MCNC: 8.7 MG/DL (ref 8.3–10.1)
CHLORIDE SERPL-SCNC: 103 MMOL/L (ref 100–108)
CLARITY UR: ABNORMAL
CO2 SERPL-SCNC: 30 MMOL/L (ref 21–32)
COLOR UR: YELLOW
CREAT SERPL-MCNC: 1.09 MG/DL (ref 0.6–1.3)
EOSINOPHIL # BLD AUTO: 0 THOUSAND/ΜL (ref 0–0.61)
EOSINOPHIL NFR BLD AUTO: 0 % (ref 0–6)
ERYTHROCYTE [DISTWIDTH] IN BLOOD BY AUTOMATED COUNT: 18.8 % (ref 11.6–15.1)
EXT PREG TEST URINE: NEGATIVE
EXT. CONTROL ED NAV: NORMAL
GFR SERPL CREATININE-BSD FRML MDRD: 65 ML/MIN/1.73SQ M
GLUCOSE SERPL-MCNC: 103 MG/DL (ref 65–140)
GLUCOSE UR STRIP-MCNC: NEGATIVE MG/DL
HCT VFR BLD AUTO: 28.5 % (ref 34.8–46.1)
HGB BLD-MCNC: 8.1 G/DL (ref 11.5–15.4)
HGB UR QL STRIP.AUTO: ABNORMAL
IMM GRANULOCYTES # BLD AUTO: 0.03 THOUSAND/UL (ref 0–0.2)
IMM GRANULOCYTES NFR BLD AUTO: 1 % (ref 0–2)
KETONES UR STRIP-MCNC: NEGATIVE MG/DL
LEUKOCYTE ESTERASE UR QL STRIP: ABNORMAL
LYMPHOCYTES # BLD AUTO: 1.7 THOUSANDS/ΜL (ref 0.6–4.47)
LYMPHOCYTES NFR BLD AUTO: 29 % (ref 14–44)
MCH RBC QN AUTO: 19.9 PG (ref 26.8–34.3)
MCHC RBC AUTO-ENTMCNC: 28.4 G/DL (ref 31.4–37.4)
MCV RBC AUTO: 70 FL (ref 82–98)
MONOCYTES # BLD AUTO: 0.4 THOUSAND/ΜL (ref 0.17–1.22)
MONOCYTES NFR BLD AUTO: 7 % (ref 4–12)
NEUTROPHILS # BLD AUTO: 3.7 THOUSANDS/ΜL (ref 1.85–7.62)
NEUTS SEG NFR BLD AUTO: 63 % (ref 43–75)
NITRITE UR QL STRIP: POSITIVE
NON-SQ EPI CELLS URNS QL MICRO: ABNORMAL /HPF
NRBC BLD AUTO-RTO: 0 /100 WBCS
PH UR STRIP.AUTO: 7 [PH] (ref 4.5–8)
PLATELET # BLD AUTO: 255 THOUSANDS/UL (ref 149–390)
PMV BLD AUTO: 10.3 FL (ref 8.9–12.7)
POTASSIUM SERPL-SCNC: 3.6 MMOL/L (ref 3.5–5.3)
PROT SERPL-MCNC: 7.4 G/DL (ref 6.4–8.2)
PROT UR STRIP-MCNC: ABNORMAL MG/DL
RBC # BLD AUTO: 4.08 MILLION/UL (ref 3.81–5.12)
RBC #/AREA URNS AUTO: ABNORMAL /HPF
SODIUM SERPL-SCNC: 138 MMOL/L (ref 136–145)
SP GR UR STRIP.AUTO: 1.02 (ref 1–1.03)
UROBILINOGEN UR QL STRIP.AUTO: 1 E.U./DL
WBC # BLD AUTO: 5.85 THOUSAND/UL (ref 4.31–10.16)
WBC #/AREA URNS AUTO: ABNORMAL /HPF

## 2020-08-30 PROCEDURE — 87077 CULTURE AEROBIC IDENTIFY: CPT | Performed by: UROLOGY

## 2020-08-30 PROCEDURE — 52332 CYSTOSCOPY AND TREATMENT: CPT | Performed by: UROLOGY

## 2020-08-30 PROCEDURE — 87086 URINE CULTURE/COLONY COUNT: CPT

## 2020-08-30 PROCEDURE — 96361 HYDRATE IV INFUSION ADD-ON: CPT

## 2020-08-30 PROCEDURE — 80053 COMPREHEN METABOLIC PANEL: CPT | Performed by: NURSE PRACTITIONER

## 2020-08-30 PROCEDURE — 85025 COMPLETE CBC W/AUTO DIFF WBC: CPT | Performed by: NURSE PRACTITIONER

## 2020-08-30 PROCEDURE — 99220 PR INITIAL OBSERVATION CARE/DAY 70 MINUTES: CPT | Performed by: INTERNAL MEDICINE

## 2020-08-30 PROCEDURE — 87186 SC STD MICRODIL/AGAR DIL: CPT | Performed by: UROLOGY

## 2020-08-30 PROCEDURE — 81001 URINALYSIS AUTO W/SCOPE: CPT

## 2020-08-30 PROCEDURE — C1769 GUIDE WIRE: HCPCS | Performed by: UROLOGY

## 2020-08-30 PROCEDURE — 74420 UROGRAPHY RTRGR +-KUB: CPT

## 2020-08-30 PROCEDURE — 99285 EMERGENCY DEPT VISIT HI MDM: CPT

## 2020-08-30 PROCEDURE — 81025 URINE PREGNANCY TEST: CPT | Performed by: NURSE PRACTITIONER

## 2020-08-30 PROCEDURE — 87086 URINE CULTURE/COLONY COUNT: CPT | Performed by: UROLOGY

## 2020-08-30 PROCEDURE — 96374 THER/PROPH/DIAG INJ IV PUSH: CPT

## 2020-08-30 PROCEDURE — 96376 TX/PRO/DX INJ SAME DRUG ADON: CPT

## 2020-08-30 PROCEDURE — 74176 CT ABD & PELVIS W/O CONTRAST: CPT

## 2020-08-30 PROCEDURE — 96375 TX/PRO/DX INJ NEW DRUG ADDON: CPT

## 2020-08-30 PROCEDURE — G1004 CDSM NDSC: HCPCS

## 2020-08-30 PROCEDURE — 99285 EMERGENCY DEPT VISIT HI MDM: CPT | Performed by: NURSE PRACTITIONER

## 2020-08-30 PROCEDURE — 99245 OFF/OP CONSLTJ NEW/EST HI 55: CPT | Performed by: UROLOGY

## 2020-08-30 PROCEDURE — 36415 COLL VENOUS BLD VENIPUNCTURE: CPT | Performed by: NURSE PRACTITIONER

## 2020-08-30 PROCEDURE — C2617 STENT, NON-COR, TEM W/O DEL: HCPCS | Performed by: UROLOGY

## 2020-08-30 DEVICE — INLAY OPTIMA URETERAL STENT W/O GUIDEWIRE
Type: IMPLANTABLE DEVICE | Site: URETER | Status: NON-FUNCTIONAL
Brand: BARD® INLAY OPTIMA® URETERAL STENT
Removed: 2020-09-29

## 2020-08-30 RX ORDER — GABAPENTIN 300 MG/1
300 CAPSULE ORAL ONCE
Status: CANCELLED | OUTPATIENT
Start: 2020-09-29 | End: 2020-08-30

## 2020-08-30 RX ORDER — FENTANYL CITRATE/PF 50 MCG/ML
50 SYRINGE (ML) INJECTION
Status: DISCONTINUED | OUTPATIENT
Start: 2020-08-30 | End: 2020-08-30 | Stop reason: HOSPADM

## 2020-08-30 RX ORDER — HYDROMORPHONE HCL/PF 1 MG/ML
0.2 SYRINGE (ML) INJECTION EVERY 4 HOURS PRN
Status: DISCONTINUED | OUTPATIENT
Start: 2020-08-30 | End: 2020-08-31 | Stop reason: HOSPADM

## 2020-08-30 RX ORDER — ONDANSETRON 2 MG/ML
4 INJECTION INTRAMUSCULAR; INTRAVENOUS ONCE
Status: COMPLETED | OUTPATIENT
Start: 2020-08-30 | End: 2020-08-30

## 2020-08-30 RX ORDER — ONDANSETRON 4 MG/1
4 TABLET, ORALLY DISINTEGRATING ORAL EVERY 6 HOURS PRN
Qty: 20 TABLET | Refills: 0 | Status: SHIPPED | OUTPATIENT
Start: 2020-08-30 | End: 2020-08-31 | Stop reason: SDUPTHER

## 2020-08-30 RX ORDER — SULFAMETHOXAZOLE AND TRIMETHOPRIM 800; 160 MG/1; MG/1
1 TABLET ORAL EVERY 12 HOURS SCHEDULED
Qty: 10 TABLET | Refills: 0 | Status: SHIPPED | OUTPATIENT
Start: 2020-08-30 | End: 2020-08-31 | Stop reason: SDUPTHER

## 2020-08-30 RX ORDER — HYDROMORPHONE HCL/PF 1 MG/ML
1 SYRINGE (ML) INJECTION ONCE
Status: COMPLETED | OUTPATIENT
Start: 2020-08-30 | End: 2020-08-30

## 2020-08-30 RX ORDER — FENTANYL CITRATE 50 UG/ML
INJECTION, SOLUTION INTRAMUSCULAR; INTRAVENOUS AS NEEDED
Status: DISCONTINUED | OUTPATIENT
Start: 2020-08-30 | End: 2020-08-30

## 2020-08-30 RX ORDER — ACETAMINOPHEN 325 MG/1
975 TABLET ORAL ONCE
Status: CANCELLED | OUTPATIENT
Start: 2020-09-29 | End: 2020-08-30

## 2020-08-30 RX ORDER — LIDOCAINE HYDROCHLORIDE 10 MG/ML
INJECTION, SOLUTION EPIDURAL; INFILTRATION; INTRACAUDAL; PERINEURAL AS NEEDED
Status: DISCONTINUED | OUTPATIENT
Start: 2020-08-30 | End: 2020-08-30

## 2020-08-30 RX ORDER — OXYCODONE HYDROCHLORIDE 5 MG/1
5 TABLET ORAL EVERY 4 HOURS PRN
Status: DISCONTINUED | OUTPATIENT
Start: 2020-08-30 | End: 2020-08-31 | Stop reason: HOSPADM

## 2020-08-30 RX ORDER — MAGNESIUM HYDROXIDE 1200 MG/15ML
LIQUID ORAL AS NEEDED
Status: DISCONTINUED | OUTPATIENT
Start: 2020-08-30 | End: 2020-08-30 | Stop reason: HOSPADM

## 2020-08-30 RX ORDER — TAMSULOSIN HYDROCHLORIDE 0.4 MG/1
0.4 CAPSULE ORAL
Qty: 30 CAPSULE | Refills: 0 | Status: SHIPPED | OUTPATIENT
Start: 2020-08-30 | End: 2020-08-31 | Stop reason: SDUPTHER

## 2020-08-30 RX ORDER — DOCUSATE SODIUM 100 MG/1
100 CAPSULE, LIQUID FILLED ORAL 3 TIMES DAILY
Qty: 90 CAPSULE | Refills: 0 | Status: SHIPPED | OUTPATIENT
Start: 2020-08-30 | End: 2020-08-31 | Stop reason: SDUPTHER

## 2020-08-30 RX ORDER — ONDANSETRON 2 MG/ML
4 INJECTION INTRAMUSCULAR; INTRAVENOUS EVERY 6 HOURS PRN
Status: DISCONTINUED | OUTPATIENT
Start: 2020-08-30 | End: 2020-08-31 | Stop reason: HOSPADM

## 2020-08-30 RX ORDER — SODIUM CHLORIDE 9 MG/ML
125 INJECTION, SOLUTION INTRAVENOUS CONTINUOUS
Status: DISCONTINUED | OUTPATIENT
Start: 2020-08-30 | End: 2020-08-30

## 2020-08-30 RX ORDER — OXYCODONE HYDROCHLORIDE 10 MG/1
10 TABLET ORAL EVERY 4 HOURS PRN
Status: DISCONTINUED | OUTPATIENT
Start: 2020-08-30 | End: 2020-08-31 | Stop reason: HOSPADM

## 2020-08-30 RX ORDER — ONDANSETRON 2 MG/ML
4 INJECTION INTRAMUSCULAR; INTRAVENOUS ONCE AS NEEDED
Status: DISCONTINUED | OUTPATIENT
Start: 2020-08-30 | End: 2020-08-30 | Stop reason: HOSPADM

## 2020-08-30 RX ORDER — ONDANSETRON 2 MG/ML
INJECTION INTRAMUSCULAR; INTRAVENOUS AS NEEDED
Status: DISCONTINUED | OUTPATIENT
Start: 2020-08-30 | End: 2020-08-30

## 2020-08-30 RX ORDER — OXYCODONE HYDROCHLORIDE AND ACETAMINOPHEN 5; 325 MG/1; MG/1
1 TABLET ORAL EVERY 4 HOURS PRN
Qty: 8 TABLET | Refills: 0 | Status: SHIPPED | OUTPATIENT
Start: 2020-08-30 | End: 2020-08-31 | Stop reason: SDUPTHER

## 2020-08-30 RX ORDER — OXYBUTYNIN CHLORIDE 5 MG/1
5 TABLET ORAL 2 TIMES DAILY PRN
Qty: 60 TABLET | Refills: 0 | Status: SHIPPED | OUTPATIENT
Start: 2020-08-30 | End: 2020-08-31 | Stop reason: SDUPTHER

## 2020-08-30 RX ORDER — SODIUM CHLORIDE 9 MG/ML
100 INJECTION, SOLUTION INTRAVENOUS CONTINUOUS
Status: DISCONTINUED | OUTPATIENT
Start: 2020-08-30 | End: 2020-08-31

## 2020-08-30 RX ORDER — CALCIUM CARBONATE 200(500)MG
500 TABLET,CHEWABLE ORAL 3 TIMES DAILY PRN
Status: DISCONTINUED | OUTPATIENT
Start: 2020-08-30 | End: 2020-08-31 | Stop reason: HOSPADM

## 2020-08-30 RX ORDER — HYDROMORPHONE HCL/PF 1 MG/ML
0.5 SYRINGE (ML) INJECTION
Status: DISCONTINUED | OUTPATIENT
Start: 2020-08-30 | End: 2020-08-30 | Stop reason: HOSPADM

## 2020-08-30 RX ORDER — KETOROLAC TROMETHAMINE 30 MG/ML
15 INJECTION, SOLUTION INTRAMUSCULAR; INTRAVENOUS ONCE
Status: COMPLETED | OUTPATIENT
Start: 2020-08-30 | End: 2020-08-30

## 2020-08-30 RX ORDER — TAMSULOSIN HYDROCHLORIDE 0.4 MG/1
0.4 CAPSULE ORAL
Status: DISCONTINUED | OUTPATIENT
Start: 2020-08-30 | End: 2020-08-31 | Stop reason: HOSPADM

## 2020-08-30 RX ORDER — MIDAZOLAM HYDROCHLORIDE 2 MG/2ML
INJECTION, SOLUTION INTRAMUSCULAR; INTRAVENOUS AS NEEDED
Status: DISCONTINUED | OUTPATIENT
Start: 2020-08-30 | End: 2020-08-30

## 2020-08-30 RX ORDER — PROPOFOL 10 MG/ML
INJECTION, EMULSION INTRAVENOUS AS NEEDED
Status: DISCONTINUED | OUTPATIENT
Start: 2020-08-30 | End: 2020-08-30

## 2020-08-30 RX ORDER — CEFAZOLIN SODIUM 2 G/50ML
2000 SOLUTION INTRAVENOUS ONCE
Status: CANCELLED | OUTPATIENT
Start: 2020-09-29 | End: 2020-08-30

## 2020-08-30 RX ORDER — MEPERIDINE HYDROCHLORIDE 25 MG/ML
12.5 INJECTION INTRAMUSCULAR; INTRAVENOUS; SUBCUTANEOUS ONCE AS NEEDED
Status: DISCONTINUED | OUTPATIENT
Start: 2020-08-30 | End: 2020-08-30 | Stop reason: HOSPADM

## 2020-08-30 RX ORDER — DEXAMETHASONE SODIUM PHOSPHATE 10 MG/ML
INJECTION, SOLUTION INTRAMUSCULAR; INTRAVENOUS AS NEEDED
Status: DISCONTINUED | OUTPATIENT
Start: 2020-08-30 | End: 2020-08-30

## 2020-08-30 RX ADMIN — ONDANSETRON 4 MG: 2 INJECTION INTRAMUSCULAR; INTRAVENOUS at 08:28

## 2020-08-30 RX ADMIN — SODIUM CHLORIDE 125 ML/HR: 0.9 INJECTION, SOLUTION INTRAVENOUS at 04:52

## 2020-08-30 RX ADMIN — OXYCODONE HYDROCHLORIDE 10 MG: 10 TABLET ORAL at 20:47

## 2020-08-30 RX ADMIN — ONDANSETRON 4 MG: 2 INJECTION INTRAMUSCULAR; INTRAVENOUS at 03:07

## 2020-08-30 RX ADMIN — ONDANSETRON 4 MG: 2 INJECTION INTRAMUSCULAR; INTRAVENOUS at 04:04

## 2020-08-30 RX ADMIN — DEXAMETHASONE SODIUM PHOSPHATE 8 MG: 10 INJECTION, SOLUTION INTRAMUSCULAR; INTRAVENOUS at 08:28

## 2020-08-30 RX ADMIN — FAMOTIDINE 20 MG: 10 INJECTION, SOLUTION INTRAVENOUS at 20:47

## 2020-08-30 RX ADMIN — TAMSULOSIN HYDROCHLORIDE 0.4 MG: 0.4 CAPSULE ORAL at 16:31

## 2020-08-30 RX ADMIN — CEFTRIAXONE SODIUM 1000 MG: 10 INJECTION, POWDER, FOR SOLUTION INTRAVENOUS at 04:10

## 2020-08-30 RX ADMIN — SODIUM CHLORIDE 1000 ML: 0.9 INJECTION, SOLUTION INTRAVENOUS at 03:03

## 2020-08-30 RX ADMIN — SODIUM CHLORIDE 100 ML/HR: 0.9 INJECTION, SOLUTION INTRAVENOUS at 05:45

## 2020-08-30 RX ADMIN — CALCIUM CARBONATE (ANTACID) CHEW TAB 500 MG 500 MG: 500 CHEW TAB at 20:47

## 2020-08-30 RX ADMIN — KETOROLAC TROMETHAMINE 15 MG: 30 INJECTION, SOLUTION INTRAMUSCULAR at 03:07

## 2020-08-30 RX ADMIN — FENTANYL CITRATE 50 MCG: 50 INJECTION, SOLUTION INTRAMUSCULAR; INTRAVENOUS at 08:29

## 2020-08-30 RX ADMIN — PROPOFOL 200 MG: 10 INJECTION, EMULSION INTRAVENOUS at 08:28

## 2020-08-30 RX ADMIN — MIDAZOLAM 2 MG: 1 INJECTION INTRAMUSCULAR; INTRAVENOUS at 08:24

## 2020-08-30 RX ADMIN — HYDROMORPHONE HYDROCHLORIDE 1 MG: 1 INJECTION, SOLUTION INTRAMUSCULAR; INTRAVENOUS; SUBCUTANEOUS at 04:06

## 2020-08-30 RX ADMIN — LIDOCAINE HYDROCHLORIDE 100 MG: 10 INJECTION, SOLUTION EPIDURAL; INFILTRATION; INTRACAUDAL; PERINEURAL at 08:28

## 2020-08-30 NOTE — TELEPHONE ENCOUNTER
Our office will add the patient to the stent tracking database, we have also placed orders for her subsequent stone surgery via ureteroscopy and laser lithotripsy

## 2020-08-30 NOTE — DISCHARGE INSTRUCTIONS
Colocación de catéter ureteral   LO QUE NECESITA SABER:     [place] le hemos puesto un tubito de plastico adentro del rinon suzanne que se llama un stent ureteral  Melanie aparato ayuda a drenar el rinon y resolver infeccion  Usted necesita otra cirugia en el futuro que se llama ureteroscopy para quitar la ly el ureter (el tubito de musculo que drena el rinon)  No podemos quitar la ly cuando la paciente tiene infeccion (wes en el kellee suyo)  Mi oficina va a arreglar todo en cuando a encontrar claude fecha por la proxima cirugia, suele sere 4-6 semanas despues de poner el stent ureteral  Suele ser normal tener urgencia y frecuencia para orinar y algo de hayden en la orina con un stent  Le he recetado medicamentos para suavizar el proceso de [place]  Si tiene preguntas o problemas, no dude en [address] ([address] [facility] en [facility])  Dr Jonathan Dewey colocación del catéter ureteral es un procedimiento que se realiza para abrir un uréter angosto o bloqueado  El uréter es el tubo que transporta la orina desde el Ervin Muse a la vejiga  El catéter es un tubo plástico day hueco que se [place] para mantener abierto el uréter y así permitir el flujo de [place]  El catéter podría permanecer instalado por varias semanas  INSTRUCCIONES SOBRE [facility]:   Medicamentos:   · Los analgésicos  podrían administrarse para quitar o disminuir el dolor  No espere a que el dolor sea muy intenso para richa el medicamento  · Antibióticos  ayudan a prevenir infecciones  Vo médico podría recetarle antibióticos mientras tenga colocado el catéter  · [place] angel medicamentos wes se le haya indicado  Consulte con vo médico si usted jagdeep que vo medicamento no le está ayudando o si presenta efectos secundarios  Infórmele si es alérgico a cualquier medicamento   Mantenga claude lista actualizada de los [place], las vitaminas y los productos herbales que charlie  Incluya los siguientes datos de los medicamentos: cantidad, frecuencia y motivo de administración  Traiga con usted la lista o los envases de la píldoras a angel citas de seguimiento  Lleve la lista de los medicamentos con usted en kellee de claude emergencia  Programe claude elma con [place] urólogo wes se le indique:  Usted necesitará citas de seguimiento regulares con [place] urólogo torsten el tiempo que tenga puesto el catéter  El urólogo lo revisará y se asegurará de que el catéter esté funcionando correctamente  Es probable que le lin algunas pruebas de [facility] para osman si hay infección  Anote angel preguntas para que se acuerde de hacerlas torsten angel visitas  Cuidados personales:   · [address]  Pregunte a [place] médico sobre la cantidad de líquido que necesita richa todos los días y cuáles le recomienda  Bebidas wes jugos de arándanos o [place] podrían ser de mucha utilidad en la prevención de infecciones urinarias  · Regrese a angel actividades regulares  el día después de que le coloquen el catéter o según las indicaciones de [place] médico      · Usted puede bañarse  el día después de colocado el catéter, si [place] médico lo autoriza a hacerlo  Comuníquese con s[place] médico o urólogo si:   · Usted tiene fiebre o escalofríos  · Usted siente que necesita orinar con frecuencia  · Usted tiene dolor cuando orina o dolor alrededor de la vejiga o el [place]  · Usted nota hayden en la orina o la [place] se ve turbia  · Usted tiene preguntas o inquietudes acerca de [place] condición o cuidado  Busque atención médica de inmediato o llame al 911 si:   · Usted orina poco o no orina nada  · Usted tiene dolor abdominal intenso  © 2017 [address] [employer] Information is for End User's use only and may not be sold, redistributed or otherwise used for commercial purposes   All illustrations and images included in CareNotes® are the copyrighted property of A D A M , Inc  or [employer]  Esta información es sólo para uso en educación  [place] intención no es darle un consejo médico sobre enfermedades o tratamientos  Colsulte con [place] Jonatan Lesser farmacéutico antes de seguir cualquier régimen médico para saber si es seguro y efectivo para usted

## 2020-08-30 NOTE — UTILIZATION REVIEW
Initial Clinical Review    Admission: Date/Time/Statement:   Admission Orders (From admission, onward)     Ordered        08/30/20 0412  Place in Observation (expected length of stay for this patient is less than two midnights)  Once                   Orders Placed This Encounter   Procedures    Place in Observation (expected length of stay for this patient is less than two midnights)     Standing Status:   Standing     Number of Occurrences:   1     Order Specific Question:   Admitting Physician     Answer:   Johanna Josue [[id number]]     Order Specific Question:   Level of Care     Answer:   Med Surg [[id number]]     ED Arrival Information     Expected Arrival Acuity Means of Arrival Escorted By Service Admission Type    - 8/30/2020 02:42 Urgent Walk-In Self Hospitalist Urgent    Arrival Complaint    flank pain        Chief Complaint   Patient presents with    Flank Pain     left flank pain that began 2 days ago that has gotten progressively worse with nausea  denies increased frequency or hematuria  History of kidney stones  Assessment/Plan:   Ms Alexis Banks is a 39 yo female who presents to the ED from home with c/o nonradiating L flank pain x 2 days with urgency and frequency, anorexia, fevers, chills  On day of admission had sharp stabbing pain in LLQ and L flank with nausea  PMH: Kidney stone, morbid obesity  In the ED she was found to have pyelonephrosis and ureteral stone size 6 mm with hydronephrosis  She is admitted to OBSERVATION status with Hydronephrosis with ureteral calculus - NPO, IV fluids, consult urology, strain urine  Pyelonephritis - IV antibiotics, follow urine culture  Normocytic anemia - iron def anemia with ferritin of 16 w/ no symptoms        8/30 Urology Consult - pyelonephritis, complicated UTI, Prox L ureteral stone with intractable pain  - will go to the OR for stent placement       ++++++++++++++++++++++++++++  8/30 Operative Note    Preop Diagnosis:  Morbid obesity ([facility] [address] ) [E66 01]  Renal colic on left side [X78]  Kidney stone [N20 0]  UTI (urinary tract infection) [N39 0]  Pyelonephritis [N12]     Post-Op Diagnosis Codes:     * Morbid obesity ([address] ) [F19 13]     * Renal colic on left side [Y91]     * Kidney stone [N20 0]     * UTI (urinary tract infection) [N39 0]     * Pyelonephritis [N12]     Procedure(s) (LRB):  INSERTION STENT URETERAL (Left)    Anesthesia: general     Operative Findings:  Findings of active cystitis noted, pus exited from left renal pelvis, sample sent by suction via syringe from severely swollen left renal unit   Successful placement of a 6 Fr x 24 cm left ureteral stent without a string  +++++++++++++++++++++++++++++    8/30 no further updates post op      ED Triage Vitals   Temperature Pulse Respirations Blood Pressure SpO2   08/30/20 0254 08/30/20 0251 08/30/20 0251 08/30/20 0251 08/30/20 0251   98 2 °F (36 8 °C) (!) 117 20 125/64 96 %      Temp Source Heart Rate Source Patient Position - Orthostatic VS BP Location FiO2 (%)   08/30/20 0251 08/30/20 0251 08/30/20 0251 08/30/20 0251 --   Temporal Monitor Sitting Right arm       Pain Score       08/30/20 0251       Worst Possible Pain          Wt Readings from Last 1 Encounters:   08/30/20 116 kg (255 lb 4 7 oz)     Additional Vital Signs:   08/30/20 0937   97 8 °F (36 6 °C)   92   20   108/59   97 %   --   None (Room air)   --   Lying    08/30/20 0918   98 5 °F (36 9 °C)   --   --   --   --   --   --   --   --    08/30/20 0915   --   105   22   113/58   98 %   --   None (Room air)   --   --    08/30/20 0900   99 4 °F (37 4 °C)   103   21   101/52   100 %   6 L/min   Simple mask   WDL   --    08/30/20 0855   --   105   16   102/51   100 %   6 L/min   Simple mask   --   --    08/30/20 0537   98 6 °F (37 °C)   104   20   114/56   100 %   --   --   --   Lying    08/30/20 0453   --   105   20   135/63   98 %   --   None (Room air)   --   Lying      Pertinent Labs/Diagnostic Test Results:     8/30 CT renal stone study abd, pelvis - 1   No significant change in the position of 6 mm obstructing calculus in the region of the left UPJ/proximal ureter  There is now severe left hydronephrosis and new diffuse left-sided perinephric stranding  Superimposed ascending infection is not excluded  Urology consultation is recommended  2   2 mm nonobstructing right renal calculus  3   Small hiatal hernia       Results from last 7 days   Lab Units 08/30/20  0259   WBC Thousand/uL 5 85   HEMOGLOBIN g/dL 8 1*   HEMATOCRIT % 28 5*   PLATELETS Thousands/uL 255   NEUTROS ABS Thousands/µL 3 70     Results from last 7 days   Lab Units 08/30/20  0259   SODIUM mmol/L 138   POTASSIUM mmol/L 3 6   CHLORIDE mmol/L 103   CO2 mmol/L 30   ANION GAP mmol/L 5   BUN mg/dL 13   CREATININE mg/dL 1 09   EGFR ml/min/1 73sq m 65   CALCIUM mg/dL 8 7     Results from last 7 days   Lab Units 08/30/20  0259   AST U/L 14   ALT U/L 26   ALK PHOS U/L 82   TOTAL PROTEIN g/dL 7 4   ALBUMIN g/dL 2 9*   TOTAL BILIRUBIN mg/dL 0 16*     Results from last 7 days   Lab Units 08/30/20  0259   GLUCOSE RANDOM mg/dL 103     Results from last 7 days   Lab Units 08/30/20  0326   CLARITY UA  Cloudy   COLOR UA  Yellow   SPEC GRAV UA  1 025   PH UA  7 0   GLUCOSE UA mg/dl Negative   KETONES UA mg/dl Negative   BLOOD UA  Moderate*   PROTEIN UA mg/dl 30 (1+)*   NITRITE UA  Positive*   BILIRUBIN UA  Negative   UROBILINOGEN UA E U /dl 1 0   LEUKOCYTES UA  Large*   WBC UA /hpf Innumerable*   RBC UA /hpf Field obscured, unable to enumerate*   BACTERIA UA /hpf Moderate*   EPITHELIAL CELLS WET PREP /hpf Occasional     ED Treatment:   Medication Administration from 08/30/2020 0242 to 08/30/2020 0531    Date/Time Order Dose Route Action   08/30/2020 0303 sodium chloride 0 9 % bolus 1,000 mL 1,000 mL Intravenous New Bag   08/30/2020 0307 ketorolac (TORADOL) injection 15 mg 15 mg Intravenous Given   08/30/2020 0307 ondansetron (ZOFRAN) injection 4 mg 4 mg Intravenous Given   08/30/2020 0410 ceftriaxone (ROCEPHIN) 1 g/50 mL in dextrose IVPB 1,000 mg Intravenous New Bag   08/30/2020 0452 sodium chloride 0 9 % infusion 125 mL/hr Intravenous New Bag   08/30/2020 0406 HYDROmorphone (DILAUDID) injection 1 mg 1 mg Intravenous Given   08/30/2020 0404 ondansetron (ZOFRAN) injection 4 mg 4 mg Intravenous Given        Past Medical History:   Diagnosis Date    Asthma      Present on Admission:   Hydronephrosis with urinary obstruction due to ureteral calculus   Pyelonephritis   Normocytic anemia    Admitting Diagnosis: Morbid obesity ([facility] [address] ) [E66 01]  Kidney stone [N20 0]  UTI (urinary tract infection) [N39 0]  Anemia [D64 9]  Pyelonephritis [N12]  Flank pain [L36 2]  Renal colic on left side [C91]  Hydronephrosis with urinary obstruction due to ureteral calculus [N13 2]     Age/Sex: 38 y o  female     Admission Orders:  Scheduled Medications:  [START ON 8/31/2020] cefTRIAXone, 1,000 mg, Intravenous, Q24H  tamsulosin, 0 4 mg, Oral, Daily With Dinner      Continuous IV Infusions:  sodium chloride, 100 mL/hr, Intravenous, Continuous      PRN Meds:  HYDROmorphone, 0 2 mg, Intravenous, Q4H PRN  ondansetron, 4 mg, Intravenous, Q6H PRN  oxyCODONE, 10 mg, Oral, Q4H PRN  oxyCODONE, 5 mg, Oral, Q4H PRN    SCDs  Ambulate q shift  Urine culture  Regular diet   IP CONSULT TO UROLOGY    Network Utilization Review Department  [email or web address]  ATTENTION: Please call with any questions or concerns to [phone] and carefully listen to the prompts so that you are directed to the right person  All voicemails are confidential   Marlys Muir all requests for admission clinical reviews, approved or denied determinations and any other requests to dedicated fax number below belonging to the campus where the patient is receiving treatment   List of dedicated fax numbers for the Facilities:  [address] [place] [address] DENIALS (Administrative/Medical Necessity) [phone]   [address] [place] [address] (Maternity/NICU/Pediatrics) [phone]   [place] [phone]   Therearian Radhasalima [phone]   Toy Blaek [phone]   [address] [facility] [address] [facility] [phone]   [facility]  [phone]   [address] [phone]

## 2020-08-30 NOTE — ASSESSMENT & PLAN NOTE
W/hx of iron deficiency w/remote Ferritin of 16    No s/sx of active bleed  Repeat iron panel, continue to monitor cbc daily

## 2020-08-30 NOTE — H&P
H&P- Fausto Duron 1985, 38 y o  female MRN: [id number]    Unit/Bed#: [facility] Encounter: [id number]    Primary Care Provider: No primary care provider on file  Date and time admitted to hospital: 8/30/2020  2:46 AM        * Hydronephrosis with urinary obstruction due to ureteral calculus  Assessment & Plan  Severe hydro on ct a/p 2* 6mm ureteral stone  Given concomitant pyelonephritis will make npo give ivf, flomax, consult urology strain all urine and treat pyelonephritis    Normocytic anemia  Assessment & Plan  W/hx of iron deficiency w/remote Ferritin of 16  No s/sx of active bleed  Repeat iron panel, continue to monitor cbc daily    Pyelonephritis  Assessment & Plan  By ct a/p and positive ua  rec'd rocephin in ed will continue  F/u ucx      VTE Prophylaxis: Pharmacologic VTE Prophylaxis contraindicated due to vte screen  / reason for no mechanical VTE prophylaxis vte screen   Code Status: fc  POLST: There is no POLST form on file for this patient (pre-hospital)  Discussion with family:     Anticipated Length of Stay:  Patient will be admitted on an Observation basis with an anticipated length of stay of  Less than 2 midnights  Justification for Hospital Stay: ureteral stone w/severe hydro  Pyelo w/o sepsis    Total Time for Visit, including Counseling / Coordination of Care: 45 minutes  Greater than 50% of this total time spent on direct patient counseling and coordination of care  Chief Complaint:   Left flank pain x 2d    History of Present Illness:    Fausto Duron is a 38 y o  female who presents with pmh of kidney stones, obesity coming to hospital for left flank pain  Pt is [place] speaking only  Conversation occurred between the pt/myself in [address]  She notes 2 days of left flank pain which was uncomfortable, and nonradiating  It was associated w/urgency/frequency of urine  She developed anorexia/ fevers/chills    On day of admission she had sharp stabbing pain in LLQ and left flank  She could not get comfortable she had nausea but no vomiting  No diarrhea/cp/sob/cough/sore throat  Came to ed and was found to have pyelo and ureteral stone w/hydro       Review of Systems:    Review of Systems    Past Medical and Surgical History:     Past Medical History:   Diagnosis Date    Asthma        Past Surgical History:   Procedure Laterality Date     SECTION         Meds/Allergies:    Prior to Admission medications    Medication Sig Start Date End Date Taking? Authorizing Provider   ibuprofen (MOTRIN) 600 mg tablet Take 1 tablet (600 mg total) by mouth every 6 (six) hours as needed for mild pain 20  9032 Florentin Mary,    ondansetron (ZOFRAN-ODT) 4 mg disintegrating tablet Take 1 tablet (4 mg total) by mouth every 8 (eight) hours as needed for nausea 20  90 Florentin Mary DO   tamsulosin (FLOMAX) 0 4 mg Take 1 capsule (0 4 mg total) by mouth daily with dinner for 10 days 20  9032 Florentin Mary DO     I have reviewed home medications with patient personally  Allergies: No Known Allergies    Social History:     Marital Status: Single   Occupation:   Patient Pre-hospital Living Situation:   Patient Pre-hospital Level of Mobility:   Patient Pre-hospital Diet Restrictions:   Substance Use History:   Social History     Substance and Sexual Activity   Alcohol Use No     Social History     Tobacco Use   Smoking Status Never Smoker   Smokeless Tobacco Never Used     Social History     Substance and Sexual Activity   Drug Use No       Family History:    History reviewed  No pertinent family history  Physical Exam:     Vitals:   Blood Pressure: 135/63 (20 0453)  Pulse: 105 (20)  Temperature: 98 2 °F (36 8 °C) (20 0254)  Temp Source: Oral (20)  Respirations: 20 (203)  Weight - Scale: 116 kg (255 lb 4 7 oz) (20 025)  SpO2: 98 % (20)    Physical Exam  Vitals signs reviewed     Constitutional: General: She is not in acute distress  Appearance: She is obese  She is not ill-appearing, toxic-appearing or diaphoretic  HENT:      Head: Normocephalic and atraumatic  Right Ear: External ear normal       Left Ear: External ear normal       Nose: Nose normal    Eyes:      Conjunctiva/sclera: Conjunctivae normal    Neck:      Musculoskeletal: Normal range of motion  Cardiovascular:      Rate and Rhythm: Normal rate and regular rhythm  Pulmonary:      Effort: Pulmonary effort is normal  No respiratory distress  Breath sounds: No wheezing, rhonchi or rales  Abdominal:      General: There is no distension  Palpations: Abdomen is soft  Tenderness: There is abdominal tenderness (TTP of LLQ)  There is left CVA tenderness  Skin:     General: Skin is warm and dry  Neurological:      Mental Status: She is alert  Mental status is at baseline  Psychiatric:         Mood and Affect: Mood normal          (  Be Sure to Include Physical Exam: Delete this entire line when you have entered your exam)    Additional Data:     Lab Results: I have personally reviewed pertinent reports  Results from last 7 days   Lab Units 08/30/20  0259   WBC Thousand/uL 5 85   HEMOGLOBIN g/dL 8 1*   HEMATOCRIT % 28 5*   PLATELETS Thousands/uL 255   NEUTROS PCT % 63   LYMPHS PCT % 29   MONOS PCT % 7   EOS PCT % 0     Results from last 7 days   Lab Units 08/30/20  0259   SODIUM mmol/L 138   POTASSIUM mmol/L 3 6   CHLORIDE mmol/L 103   CO2 mmol/L 30   BUN mg/dL 13   CREATININE mg/dL 1 09   ANION GAP mmol/L 5   CALCIUM mg/dL 8 7   ALBUMIN g/dL 2 9*   TOTAL BILIRUBIN mg/dL 0 16*   ALK PHOS U/L 82   ALT U/L 26   AST U/L 14   GLUCOSE RANDOM mg/dL 103                       Imaging: I have personally reviewed pertinent reports  CT renal stone study abdomen pelvis without contrast   Final Result by Jordan Skaggs MD (08/30 8013)      1    No significant change in the position of 6 mm obstructing calculus in the region of the left UPJ/proximal ureter  There is now severe left hydronephrosis and new diffuse left-sided perinephric stranding  Superimposed ascending infection is not    excluded  Urology consultation is recommended  2   2 mm nonobstructing right renal calculus  3   Small hiatal hernia  I personally discussed this study with Yaya Taylor on 8/30/2020 at 4:07 AM                Workstation performed: [id number]             EKG, Pathology, and Other Studies Reviewed on Admission:   · EKG:   Allscripts / Epic Records Reviewed: Yes     ** Please Note: This note has been constructed using a voice recognition system   **

## 2020-08-30 NOTE — ANESTHESIA PREPROCEDURE EVALUATION
Procedure:  INSERTION STENT URETERAL (Left Bladder)    Relevant Problems   CARDIO (within normal limits)      GI/HEPATIC (within normal limits)      /RENAL   (+) Hydronephrosis with urinary obstruction due to ureteral calculus      HEMATOLOGY   (+) Normocytic anemia      MUSCULOSKELETAL (within normal limits)      NEURO/PSYCH (within normal limits)      PULMONARY (within normal limits)      Other   (+) Obesity, Class III, BMI 40-49 9 (morbid obesity) (HCC)   (+) Pyelonephritis        Physical Exam    Airway    Mallampati score: I  TM Distance: <3 FB  Neck ROM: full     Dental   No notable dental hx     Cardiovascular  Rhythm: regular, Rate: normal, Cardiovascular exam normal    Pulmonary  Pulmonary exam normal     Other Findings        Anesthesia Plan  ASA Score- 3 Emergent    Anesthesia Type- general with ASA Monitors  Additional Monitors:   Airway Plan: LMA  Plan Factors-Exercise tolerance (METS): >4 METS  Chart reviewed  Existing labs reviewed  Patient summary reviewed  Patient is a current smoker  Patient not instructed to abstain from smoking on day of procedure  Patient did not smoke on day of surgery  Induction- intravenous  Postoperative Plan-     Informed Consent- Anesthetic plan and risks discussed with patient

## 2020-08-30 NOTE — OP NOTE
OPERATIVE REPORT  PATIENT NAME: Parker Shell    :  1985  MRN: [id number]  Pt Location: [facility] OR ROOM 01    SURGERY DATE: 2020    Surgeon(s) and Role:     * Jorge Luis Islas MD - Primary    Preop Diagnosis:  Morbid obesity ([facility] [address] ) [U69 12]  Renal colic on left side [D24]  Kidney stone [N20 0]  UTI (urinary tract infection) [N39 0]  Pyelonephritis [N12]    Post-Op Diagnosis Codes:     * Morbid obesity ([address] ) [P51 23]     * Renal colic on left side [Y51]     * Kidney stone [N20 0]     * UTI (urinary tract infection) [N39 0]     * Pyelonephritis [N12]    Procedure(s) (LRB):  INSERTION STENT URETERAL (Left)    Specimen(s):  ID Type Source Tests Collected by Time Destination   A : left renal pelvis urine for culture Urine Urine, Renal, Left URINE CULTURE Jorge Luis Islas MD 2020 2455        Estimated Blood Loss:   Minimal    Drains:  Ureteral Drain/Stent Left ureter 6 Fr  (Active)   Site Assessment Clean; Intact 20 0826   Number of days: 0       Anesthesia Type:   General    Operative Indications: Morbid obesity ([address] ) [T47 90]  Renal colic on left side [F03]  Kidney stone [N20 0]  UTI (urinary tract infection) [N39 0]  Pyelonephritis [N12]      Operative Findings:  Findings of active cystitis noted, pus exited from left renal pelvis, sample sent by suction via syringe from severely swollen left renal unit  Successful placement of a 6 Fr x 24 cm left ureteral stent without a string    Complications:   None    Procedure and Technique:    PROCEDURES PERFORMED:  1) Cystoscopy  2) Left retrograde pyelography with fluoroscopic interpretation  3) Left ureteral stent placement (6F x 24cm    SURGEON:  Jorge Luis Islas MD    ASSISTANTS:  none    NOTE:  There were no qualified teaching residents to assist with this case    ANESTHESIA: General     COMPLICATIONS:   None    ANTIBIOTICS:  rocephin    INTRAOPERATIVE THROMBOEMBOLISM PROPHYLAXIS:  Pneumatic compression stockings     RADIOLOGIC FINDINGS:    1  Retrograde pyelogram was performed on the LEFT side using a 5 Fr open ended catheter  15 mL of 50% dilute contrast was injected at multiple points of the ureter (initially this contrast passed only distally and did not reach the renal pelvis)    2  The following findings were noted: Severe hydroureteronephrosis  Massively dilated calyces  No filling defects  Contrast very slowly drained out of the collecting system  OVERALL IMPRESSION:    1  Cystoscopy demonstrated signs of active cystitis and mucosal breakdown, no malignant findings, pus extruding from left ureteral orifice even prior to wire placement    INDICATIONS FOR PROCEDURE:  Safia Negrete is an 38 y o  old female with a LEFT ureteral stone, UTI, pyelonephritis, flank pain, and left hydronephrosis  After discussing the options, the patient elected to undergo ureteral stent placement  We discussed the procedure in detail, the alternatives, and the risks, and they signed informed consent to proceed  PROCEDURE IN DETAIL:   The patient was identified and brought to the OR  Antibiotic prophylaxis and DVT prophylaxis were administered  They were placed in the comfortable dorsal lithotomy position with care to pad all pressure points  They were prepped and draped in the usual sterile fashion using hibiclens  A surgical time out was performed with all in the room in agreement with the correct patient, procedure, indications, and laterality  A 21-German rigid cystoscope was used to enter the bladder  The bladder was inspected in its entirety and there were no lesions noted apart from red patches and mucosal irritation consistent with active UTI  The ureteral orifices were identified in their orthotopic positions  The LEFT ureteral orifice was identified and a 5 Fr open ended catheter was placed into the ureteral orifice  The stone was not visible on  fluoroscopic guidance    A retrograde pyelogram was performed with injection of 50/50 Isovue which demonstrated Severe hydroureteronephrosis  A solo wire was up to the kidney under fluoroscopic guidance  A 6 Fr x 24 cm JJ stent was then passed up the wire  under fluoroscopic guidance into the left  kidney with a good curl noted in the kidney and in the bladder  The bladder was drained  The patient was placed back in the supine position, awakened from general anesthesia and brought to the recovery room in stable condition  SPECIMENS:   Order Name Source Comment Collection Info Order Time   URINE CULTURE Urine, Renal, Left  Collected By: Jey Fields MD 8/30/2020  8:35 AM        IMPLANTS:   Implant Name Type Inv  Item Serial No   Lot No  LRB No  Used Action   URETERAL STENT 6 FR X 24 CM OPTIMA INLAY - [id number]  URETERAL STENT 6 FR X 24 CM OPTIM[employer] INLAY  [employer] [id number] Left 1 Implanted        COMPLICATIONS: None    DISPOSITION: PACU     PLAN: Patient will need ureteroscopy at an interval, I have placed orders for this purpose  Remains under the care of the medicine service  If doing well clinically this evening may be able to go home on PO antibiotics  I have sent all medications for discharge including empiric bactrim to her preferred pharmacy  If continued tachycardia or fevers would recommend keeping her in hospital until culture data has returned         I was present for the entire procedure and A qualified resident physician was not available    Patient Disposition:  PACU     SIGNATURE: Jey Fields MD  DATE: August 30, 2020  TIME: 8:41 AM

## 2020-08-30 NOTE — ED PROVIDER NOTES
History  Chief Complaint   Patient presents with    Flank Pain     left flank pain that began 2 days ago that has gotten progressively worse with nausea  denies increased frequency or hematuria  History of kidney stones  This is a 38year old female who comes to the ED with family and c/o left flank pain that radiates into the left abdomen x 3 days  + nausea  She has a hx of kidney stones  She was seen here in  with 6 mm obstructing stone and did not follow up due to insurance and ss# issues  She denies dysuria, vomiting  Unknown fevers but states she has been cold  She states she has been taking tylenol w/o relief  Pt states her pain did get slightly better after last ED visit in   History provided by:  Patient, medical records and relative  Flank Pain   Pain location:  L flank, LLQ and LUQ  Pain severity:  Moderate  Onset quality:  Gradual  Duration:  3 days  Chronicity:  Recurrent  Relieved by:  Nothing  Ineffective treatments:  Acetaminophen  Associated symptoms: nausea    Risk factors: obesity        None       Past Medical History:   Diagnosis Date    Asthma        Past Surgical History:   Procedure Laterality Date     SECTION         History reviewed  No pertinent family history  I have reviewed and agree with the history as documented  E-Cigarette/Vaping    E-Cigarette Use Never User      E-Cigarette/Vaping Substances     Social History     Tobacco Use    Smoking status: Never Smoker    Smokeless tobacco: Never Used   Substance Use Topics    Alcohol use: No    Drug use: No       Review of Systems   Gastrointestinal: Positive for abdominal pain and nausea  Genitourinary: Positive for flank pain  All other systems reviewed and are negative  Physical Exam  Physical Exam  Vitals signs and nursing note reviewed  Constitutional:       General: She is not in acute distress  Appearance: Normal appearance  She is obese   She is not ill-appearing, toxic-appearing or diaphoretic  HENT:      Head: Normocephalic and atraumatic  Neck:      Musculoskeletal: Normal range of motion  Cardiovascular:      Rate and Rhythm: Normal rate and regular rhythm  Pulmonary:      Effort: Pulmonary effort is normal       Breath sounds: Normal breath sounds  Abdominal:      Palpations: Abdomen is soft  Tenderness: There is abdominal tenderness  Comments: Left flank, left upper and lower quad  Musculoskeletal: Normal range of motion  Skin:     General: Skin is warm and dry  Capillary Refill: Capillary refill takes less than 2 seconds  Neurological:      General: No focal deficit present  Mental Status: She is alert and oriented to person, place, and time  Psychiatric:         Mood and Affect: Mood normal          Behavior: Behavior normal          Thought Content:  Thought content normal          Judgment: Judgment normal          Vital Signs  ED Triage Vitals   Temperature Pulse Respirations Blood Pressure SpO2   08/30/20 0254 08/30/20 0251 08/30/20 0251 08/30/20 0251 08/30/20 0251   98 2 °F (36 8 °C) (!) 117 20 125/64 96 %      Temp Source Heart Rate Source Patient Position - Orthostatic VS BP Location FiO2 (%)   08/30/20 0251 08/30/20 0251 08/30/20 0251 08/30/20 0251 --   Temporal Monitor Sitting Right arm       Pain Score       08/30/20 0251       Worst Possible Pain           Vitals:    08/30/20 0251 08/30/20 0453   BP: 125/64 135/63   Pulse: (!) 117 105   Patient Position - Orthostatic VS: Sitting Lying         Visual Acuity      ED Medications  Medications   sodium chloride 0 9 % infusion (125 mL/hr Intravenous New Bag 8/30/20 0452)   sodium chloride 0 9 % bolus 1,000 mL (1,000 mL Intravenous New Bag 8/30/20 0303)   ketorolac (TORADOL) injection 15 mg (15 mg Intravenous Given 8/30/20 0307)   ondansetron (ZOFRAN) injection 4 mg (4 mg Intravenous Given 8/30/20 0307)   ceftriaxone (ROCEPHIN) 1 g/50 mL in dextrose IVPB (0 mg Intravenous Stopped 8/30/20 0448)   HYDROmorphone (DILAUDID) injection 1 mg (1 mg Intravenous Given 8/30/20 0406)   ondansetron (ZOFRAN) injection 4 mg (4 mg Intravenous Given 8/30/20 0404)       Diagnostic Studies  Results Reviewed     Procedure Component Value Units Date/Time    Urine Microscopic [[id number]]  (Abnormal) Collected:  08/30/20 0326    Lab Status:  Final result Specimen:  Urine, Clean Catch Updated:  08/30/20 0355     RBC, UA       Field obscured, unable to enumerate     /hpf     WBC, UA Innumerable /hpf      Epithelial Cells Occasional /hpf      Bacteria, UA Moderate /hpf     Urine culture [[id number]] Collected:  08/30/20 0326    Lab Status:   In process Specimen:  Urine, Clean Catch Updated:  08/30/20 0355    POCT pregnancy, urine [[id number]]  (Normal) Resulted:  08/30/20 0329    Lab Status:  Final result Updated:  08/30/20 0329     EXT PREG TEST UR (Ref: Negative) NEGATIVE     Control VALID    POCT urinalysis dipstick [[id number]]  (Abnormal) Resulted:  08/30/20 0329    Lab Status:  Final result Specimen:  Urine Updated:  08/30/20 0329    Urine Macroscopic, POC [[id number]]  (Abnormal) Collected:  08/30/20 0326    Lab Status:  Final result Specimen:  Urine Updated:  08/30/20 0328     Color, UA Yellow     Clarity, UA Cloudy     pH, UA 7 0     Leukocytes, UA Large     Nitrite, UA Positive     Protein, UA 30 (1+) mg/dl      Glucose, UA Negative mg/dl      Ketones, UA Negative mg/dl      Urobilinogen, UA 1 0 E U /dl      Bilirubin, UA Negative     Blood, UA Moderate     Specific Gravity, UA 1 025    Narrative:       CLINITEK RESULT    Comprehensive metabolic panel [[id number]]  (Abnormal) Collected:  08/30/20 0259    Lab Status:  Final result Specimen:  Blood from Arm, Left Updated:  08/30/20 0324     Sodium 138 mmol/L      Potassium 3 6 mmol/L      Chloride 103 mmol/L      CO2 30 mmol/L      ANION GAP 5 mmol/L      BUN 13 mg/dL      Creatinine 1 09 mg/dL      Glucose 103 mg/dL      Calcium 8 7 mg/dL      AST 14 U/L ALT 26 U/L      Alkaline Phosphatase 82 U/L      Total Protein 7 4 g/dL      Albumin 2 9 g/dL      Total Bilirubin 0 16 mg/dL      eGFR 65 ml/min/1 73sq m     Narrative:       [place] guidelines for Chronic Kidney Disease (CKD):     Stage 1 with normal or high GFR (GFR > 90 mL/min/1 73 square meters)    Stage 2 Mild CKD (GFR = 60-89 mL/min/1 73 square meters)    Stage 3A Moderate CKD (GFR = 45-59 mL/min/1 73 square meters)    Stage 3B Moderate CKD (GFR = 30-44 mL/min/1 73 square meters)    Stage 4 Severe CKD (GFR = 15-29 mL/min/1 73 square meters)    Stage 5 End Stage CKD (GFR <15 mL/min/1 73 square meters)  Note: GFR calculation is accurate only with a steady state creatinine    CBC and differential [[id number]]  (Abnormal) Collected:  08/30/20 0259    Lab Status:  Final result Specimen:  Blood from Arm, Left Updated:  08/30/20 0310     WBC 5 85 Thousand/uL      RBC 4 08 Million/uL      Hemoglobin 8 1 g/dL      Hematocrit 28 5 %      MCV 70 fL      MCH 19 9 pg      MCHC 28 4 g/dL      RDW 18 8 %      MPV 10 3 fL      Platelets 948 Thousands/uL      nRBC 0 /100 WBCs      Neutrophils Relative 63 %      Immat GRANS % 1 %      Lymphocytes Relative 29 %      Monocytes Relative 7 %      Eosinophils Relative 0 %      Basophils Relative 0 %      Neutrophils Absolute 3 70 Thousands/µL      Immature Grans Absolute 0 03 Thousand/uL      Lymphocytes Absolute 1 70 Thousands/µL      Monocytes Absolute 0 40 Thousand/µL      Eosinophils Absolute 0 00 Thousand/µL      Basophils Absolute 0 02 Thousands/µL                  CT renal stone study abdomen pelvis without contrast   Final Result by Orlando Frederick MD (08/30 4590)      1  No significant change in the position of 6 mm obstructing calculus in the region of the left UPJ/proximal ureter  There is now severe left hydronephrosis and new diffuse left-sided perinephric stranding  Superimposed ascending infection is not    excluded    Urology consultation is recommended  2   2 mm nonobstructing right renal calculus  3   Small hiatal hernia  I personally discussed this study with Orlando Schwartz on 8/30/2020 at 4:07 AM                Workstation performed: [id number]                    Procedures  Procedures         ED Course  ED Course as of Aug 30 0527   Sun Aug 30, 2020   0312 Hemoglobin(!): 8 1   0330 Nitrite, UA(!): Positive   0330 Urine with large leuks and positive nitrates  Leukocytes, UA(!): Large   0356 Lab micro urine WBC innumerable, moderate bacteria  Ordered IV Rocephin  Pt requesting something else for pain, although she appears comfortable and is talking on her cell phone  All labs reviewed and discussed with pt  Waiting on CT results  Discussed with pt importance of admission and urology consult as it appears pt has infected kidney stone  She agrees with POC        0405 Radiology called to state pt continues to have 6 mm obstructing stone with severe hydro and stranding         0406 [place] text to [facility] for admission  0409 Pt will need IVF, abx and urology consult        Tray Black will accept for observation  /63 (BP Location: Right arm)   Pulse 105   Temp 98 2 °F (36 8 °C) (Oral)   Resp 20   Wt 116 kg (255 lb 4 7 oz)   LMP 08/16/2020   SpO2 98%     US AUDIT      Most Recent Value   Initial Alcohol Screen: US AUDIT-C    1  How often do you have a drink containing alcohol?  0 Filed at: 08/30/2020 0255   2  How many drinks containing alcohol do you have on a typical day you are drinking? 0 Filed at: 08/30/2020 0255   3b  FEMALE Any Age, or MALE 65+: How often do you have 4 or more drinks on one occassion? 0 Filed at: 08/30/2020 0255   Audit-C Score  0 Filed at: 08/30/2020 0255                  CATIA/DAST-10      Most Recent Value   How many times in the past year have you    Used an illegal drug or used a prescription medication for non-medical reasons?   Never Filed at: 08/30/2020 0255                                MDM  Number of Diagnoses or Management Options  Diagnosis management comments: Left flank and abdomen pain - hx kidney stones    DDX:  Renal colic  Obstructing kidney stone  UTI    Plan  IV  IVF  Pain control  ua   uahcg  CT renal stone study  Reassess          Amount and/or Complexity of Data Reviewed  Clinical lab tests: ordered and reviewed  Tests in the radiology section of CPT®: ordered and reviewed  Review and summarize past medical records: yes          Disposition  Final diagnoses:   Anemia   Morbid obesity ([facility] [address] )   Renal colic on left side   Kidney stone - left   UTI (urinary tract infection)     Time reflects when diagnosis was documented in both MDM as applicable and the Disposition within this note     Time User Action Codes Description Comment    8/30/2020  3:18 AM Jensen [place] Add [D64 9] Anemia     8/30/2020  3:18 AM Jensen [place] Add [E66 01] Morbid obesity ([address] )     8/30/2020  4:12 AM Jensen [place] Add [W71] Renal colic on left side     8/30/2020  4:13 AM Jensen [place] Add [N20 0] Kidney stone     8/30/2020  4:13 AM Jensen [place] Modify [N20 0] Kidney stone left    8/30/2020  4:13 AM Jensen [place] Add [N39 0] UTI (urinary tract infection)     8/30/2020  4:13 AM Jensen [place] Modify [D64 9] Anemia     8/30/2020  4:13 AM Jensen [place] Modify [N39 0] UTI (urinary tract infection)     8/30/2020  5:14 AM DelNahomi yeungler Add [N13 2] Hydronephrosis with urinary obstruction due to ureteral calculus     8/30/2020  5:14 AM Dmitry Pulse Add [N12] Pyelonephritis       ED Disposition     ED Disposition Condition Date/Time Comment    Admit Stable Sun Aug 30, 2020  4:11 AM Case was discussed with OANH and the patient's admission status was agreed to be Admission Status: observation status to the service of Dr Parisi Skill           Follow-up Information    None         Patient's Medications   Discharge Prescriptions    No medications on file No discharge procedures on file      PDMP Review       Value Time User    PDMP Reviewed  Yes 6/26/2020  5:40 AM 9032 Florentin Mary DO          ED Provider  Electronically Signed by           Dejan Ramirez  08/30/20 3746

## 2020-08-30 NOTE — PLAN OF CARE
Problem: Potential for Falls  Goal: Patient will remain free of falls  Description: INTERVENTIONS:  - Assess patient frequently for physical needs  -  Identify cognitive and physical deficits and behaviors that affect risk of falls    -  [place] fall precautions as indicated by assessment   - Educate patient/family on patient safety including physical limitations  - Instruct patient to call for assistance with activity based on assessment  - Modify environment to reduce risk of injury  - Consider OT/PT consult to assist with strengthening/mobility  Outcome: Progressing     Problem: PAIN - ADULT  Goal: Verbalizes/displays adequate comfort level or baseline comfort level  Description: Interventions:  - Encourage patient to monitor pain and request assistance  - Assess pain using appropriate pain scale  - Administer analgesics based on type and severity of pain and evaluate response  - Implement non-pharmacological measures as appropriate and evaluate response  - Consider cultural and social influences on pain and pain management  - Notify physician/advanced practitioner if interventions unsuccessful or patient reports new pain  Outcome: Progressing     Problem: INFECTION - ADULT  Goal: Absence or prevention of progression during hospitalization  Description: INTERVENTIONS:  - Assess and monitor for signs and symptoms of infection  - Monitor lab/diagnostic results  - Monitor all insertion sites, i e  indwelling lines, tubes, and drains  - Monitor endotracheal if appropriate and nasal secretions for changes in amount and color  - [place] appropriate cooling/warming therapies per order  - Administer medications as ordered  - Instruct and encourage patient and family to use good hand hygiene technique  - Identify and instruct in appropriate isolation precautions for identified infection/condition  Outcome: Progressing  Goal: Absence of fever/infection during neutropenic period  Description: INTERVENTIONS:  - Monitor WBC    Outcome: Progressing     Problem: SAFETY ADULT  Goal: Patient will remain free of falls  Description: INTERVENTIONS:  - Assess patient frequently for physical needs  -  Identify cognitive and physical deficits and behaviors that affect risk of falls    -  [place] fall precautions as indicated by assessment   - Educate patient/family on patient safety including physical limitations  - Instruct patient to call for assistance with activity based on assessment  - Modify environment to reduce risk of injury  - Consider OT/PT consult to assist with strengthening/mobility  Outcome: Progressing  Goal: Maintain or return to baseline ADL function  Description: INTERVENTIONS:  -  Assess patient's ability to carry out ADLs; assess patient's baseline for ADL function and identify physical deficits which impact ability to perform ADLs (bathing, care of mouth/teeth, toileting, grooming, dressing, etc )  - Assess/evaluate cause of self-care deficits   - Assess range of motion  - Assess patient's mobility; develop plan if impaired  - Assess patient's need for assistive devices and provide as appropriate  - Encourage maximum independence but intervene and supervise when necessary  - Involve family in performance of ADLs  - Assess for home care needs following discharge   - Consider OT consult to assist with ADL evaluation and planning for discharge  - Provide patient education as appropriate  Outcome: Progressing  Goal: Maintain or return mobility status to optimal level  Description: INTERVENTIONS:  - Assess patient's baseline mobility status (ambulation, transfers, stairs, etc )    - Identify cognitive and physical deficits and behaviors that affect mobility  - Identify mobility aids required to assist with transfers and/or ambulation (gait belt, sit-to-stand, lift, walker, cane, etc )  - [place] fall precautions as indicated by assessment  - Record patient progress and toleration of activity level on Mobility SBAR; progress patient to next Phase/Stage  - Instruct patient to call for assistance with activity based on assessment  - Consider rehabilitation consult to assist with strengthening/weightbearing, etc   Outcome: Progressing     Problem: Knowledge Deficit  Goal: Patient/family/caregiver demonstrates understanding of disease process, treatment plan, medications, and discharge instructions  Description: Complete learning assessment and assess knowledge base    Interventions:  - Provide teaching at level of understanding  - Provide teaching via preferred learning methods  Outcome: Progressing     Problem: GASTROINTESTINAL - ADULT  Goal: Minimal or absence of nausea and/or vomiting  Description: INTERVENTIONS:  - Administer IV fluids if ordered to ensure adequate hydration  - Maintain NPO status until nausea and vomiting are resolved  - Nasogastric tube if ordered  - Administer ordered antiemetic medications as needed  - Provide nonpharmacologic comfort measures as appropriate  - Advance diet as tolerated, if ordered  - Consider nutrition services referral to assist patient with adequate nutrition and appropriate food choices  Outcome: Progressing

## 2020-08-30 NOTE — ED NOTES
Patient informed of needed urine specimen, patient unable to void at this time       Ana Paula Brown RN  08/30/20 0544

## 2020-08-30 NOTE — CONSULTS
UROLOGY CONSULTATION NOTE     Patient Identifiers: Elpidio Blevins (MRN [id number])  Service Requesting Consultation: [address] [place] [address] Internal Medicine  Service Providing Consultation:  Urology, Milana Cisse MD    Date of Service: 2020  Inpatient consult to Urology  Consult performed by: Milana Cisse MD  Consult ordered by: Milana Cisse MD          Reason for Consultation: Complicated UTI, flank pain, hydronephrosis, nephrolithiasis     History of Present Illness:     Elpidio Blevins is a 38 y o  old with a history of 2 days of worsening and severe left flank pain  Also has symptoms of UTI with urgency, frequency, and dysuria  Also complaining of fevers and chills at home  Also nausea is present  There are no aggravating or alleviating factors and no other associated symptoms apart from the above  Review of our medical record shows no previous encounters with our urology group  Review of care everywhere does show that she had a visit scheduled last year for a nonobstructing renal stone that is likely the stone seen in her ureter at this time  Otherwise denies and personal and family history of urologic malignancy and malady       The following portions of the patient's history were reviewed and updated as appropriate: allergies, current medications, past family history, past medical history, past social history, past surgical history and problem list       Past Medical, Past Surgical History:     Past Medical History:   Diagnosis Date    Asthma    :    Past Surgical History:   Procedure Laterality Date     SECTION     :    Medications, Allergies:     Current Facility-Administered Medications   Medication Dose Route Frequency    [START ON 2020] cefTRIAXone (ROCEPHIN) 1,000 mg in dextrose 5 % 50 mL IVPB  1,000 mg Intravenous Q24H    HYDROmorphone (DILAUDID) injection 0 2 mg  0 2 mg Intravenous Q4H PRN    ondansetron (ZOFRAN) injection 4 mg  4 mg Intravenous Q6H PRN    oxyCODONE (ROXICODONE) immediate release tablet 10 mg  10 mg Oral Q4H PRN    oxyCODONE (ROXICODONE) IR tablet 5 mg  5 mg Oral Q4H PRN    sodium chloride 0 9 % infusion  100 mL/hr Intravenous Continuous    tamsulosin (FLOMAX) capsule 0 4 mg  0 4 mg Oral Daily With Dinner       Allergies:  No Known Allergies:    Social and Family History:   Social History:   Social History     Tobacco Use    Smoking status: Never Smoker    Smokeless tobacco: Never Used   Substance Use Topics    Alcohol use: No    Drug use: No        Social History     Tobacco Use   Smoking Status Never Smoker   Smokeless Tobacco Never Used       Family History:  History reviewed  No pertinent family history :     Review of Systems:     General: Fever, chills, or night sweats: positive  Cardiac: Negative for chest pain  Pulmonary: Negative for shortness of breath  Gastrointestinal: Abdominal pain positive  Nausea, vomiting, or diarrhea positive,  Genitourinary: See HPI above  Patient does not have hematuria  All other systems were queried and were negative except as listed above in HPI and here in the ROS  Physical Exam:   /56 (BP Location: Right arm)   Pulse 104   Temp 98 6 °F (37 °C) (Temporal)   Resp 20   Wt 116 kg (255 lb 4 7 oz)   LMP 2020   SpO2 100% Temp (24hrs), Av 4 °F (36 9 °C), Min:98 2 °F (36 8 °C), Max:98 6 °F (37 °C)  current; Temperature: 98 6 °F (37 °C)  I/O last 24 hours: In: 160 4 [I V :110 4; IV Piggyback:50]  Out: -   General: Patient is pleasant and in NAD  Awake and alert    Cardiac: Peripheral edema: negative, peripheral pulses are present and indicated a tachycardic rate and rhythm    Pulmonary: Non-labored breathing, speaking in full sentences, no wheezing, no coughing    Abdomen: Soft, non-tender, non-distended  Genitourinary: Negative CVA tenderness, negative suprapubic tenderness      Neurological: Cranial nerves II-XII are intact, no sensory deficits, no neurological deficits    Musculoskeletal: Extremities are warm, ROM is not assessed    Psychiatric: The patient's train of thought is linear and logical, mood and affect are normal    Lymphatic: There is not adenopathy in the inguinal region  Skin: intact    TENA: none        Labs:     Lab Results   Component Value Date    HGB 8 1 (L) 08/30/2020    HCT 28 5 (L) 08/30/2020    WBC 5 85 08/30/2020     08/30/2020   ]    Lab Results   Component Value Date    K 3 6 08/30/2020     08/30/2020    CO2 30 08/30/2020    BUN 13 08/30/2020    CREATININE 1 09 08/30/2020    CALCIUM 8 7 08/30/2020    GLUCOSE 104 01/09/2018   ]    Imaging:   I personally reviewed the images and report of the following studies, and reviewed them with the patient:    CT Abdomen/Pelvis: Severe left hydronephrosis, and a proximal ureteral stone on the left hand side      ASSESSMENT:     38 y o  old female with  pyelonephritis, complicated uti, proximal left ureteral stone and intractable pain  Discussed with her in [place] the risks, benefits, and alternatives of today's procedure, including risks of urgency, dysuria, frequency, stent colic, and risks of infection, bleeding, pain, need for nephrostomy tube and ureteral stricture/injury risk  She wishes to undergo ureteral stent placement with a plan for future ureteroscopic stone intervention at an interval    Signed informed consent in [place] today    Marked on her left arm    PLAN:     Proceed to cystoscopy and left retrograde pyelography with left ureteral stent placement    Potential discharge to home later today if no fevers and if otherwise doing well  Thank you for allowing me to participate in this patients care  Please do not hesitate to call with any additional questions    Von Field MD

## 2020-08-30 NOTE — ASSESSMENT & PLAN NOTE
Severe hydro on ct a/p 2* 6mm ureteral stone    Given concomitant pyelonephritis will make npo give ivf, flomax, consult urology strain all urine and treat pyelonephritis

## 2020-08-30 NOTE — PLAN OF CARE
Problem: Potential for Falls  Goal: Patient will remain free of falls  Description: INTERVENTIONS:  - Assess patient frequently for physical needs  -  Identify cognitive and physical deficits and behaviors that affect risk of falls    -  [place] fall precautions as indicated by assessment   - Educate patient/family on patient safety including physical limitations  - Instruct patient to call for assistance with activity based on assessment  - Modify environment to reduce risk of injury  - Consider OT/PT consult to assist with strengthening/mobility  Outcome: Progressing     Problem: PAIN - ADULT  Goal: Verbalizes/displays adequate comfort level or baseline comfort level  Description: Interventions:  - Encourage patient to monitor pain and request assistance  - Assess pain using appropriate pain scale  - Administer analgesics based on type and severity of pain and evaluate response  - Implement non-pharmacological measures as appropriate and evaluate response  - Consider cultural and social influences on pain and pain management  - Notify physician/advanced practitioner if interventions unsuccessful or patient reports new pain  Outcome: Progressing     Problem: INFECTION - ADULT  Goal: Absence or prevention of progression during hospitalization  Description: INTERVENTIONS:  - Assess and monitor for signs and symptoms of infection  - Monitor lab/diagnostic results  - Monitor all insertion sites, i e  indwelling lines, tubes, and drains  - Monitor endotracheal if appropriate and nasal secretions for changes in amount and color  - [place] appropriate cooling/warming therapies per order  - Administer medications as ordered  - Instruct and encourage patient and family to use good hand hygiene technique  - Identify and instruct in appropriate isolation precautions for identified infection/condition  Outcome: Progressing  Goal: Absence of fever/infection during neutropenic period  Description: INTERVENTIONS:  - Monitor WBC    Outcome: Progressing     Problem: SAFETY ADULT  Goal: Patient will remain free of falls  Description: INTERVENTIONS:  - Assess patient frequently for physical needs  -  Identify cognitive and physical deficits and behaviors that affect risk of falls    -  [place] fall precautions as indicated by assessment   - Educate patient/family on patient safety including physical limitations  - Instruct patient to call for assistance with activity based on assessment  - Modify environment to reduce risk of injury  - Consider OT/PT consult to assist with strengthening/mobility  Outcome: Progressing  Goal: Maintain or return to baseline ADL function  Description: INTERVENTIONS:  -  Assess patient's ability to carry out ADLs; assess patient's baseline for ADL function and identify physical deficits which impact ability to perform ADLs (bathing, care of mouth/teeth, toileting, grooming, dressing, etc )  - Assess/evaluate cause of self-care deficits   - Assess range of motion  - Assess patient's mobility; develop plan if impaired  - Assess patient's need for assistive devices and provide as appropriate  - Encourage maximum independence but intervene and supervise when necessary  - Involve family in performance of ADLs  - Assess for home care needs following discharge   - Consider OT consult to assist with ADL evaluation and planning for discharge  - Provide patient education as appropriate  Outcome: Progressing  Goal: Maintain or return mobility status to optimal level  Description: INTERVENTIONS:  - Assess patient's baseline mobility status (ambulation, transfers, stairs, etc )    - Identify cognitive and physical deficits and behaviors that affect mobility  - Identify mobility aids required to assist with transfers and/or ambulation (gait belt, sit-to-stand, lift, walker, cane, etc )  - [place] fall precautions as indicated by assessment  - Record patient progress and toleration of activity level on Mobility SBAR; progress patient to next Phase/Stage  - Instruct patient to call for assistance with activity based on assessment  - Consider rehabilitation consult to assist with strengthening/weightbearing, etc   Outcome: Progressing     Problem: DISCHARGE PLANNING  Goal: Discharge to home or other facility with appropriate resources  Description: INTERVENTIONS:  - Identify barriers to discharge w/patient and caregiver  - Arrange for needed discharge resources and transportation as appropriate  - Identify discharge learning needs (meds, wound care, etc )  - Arrange for interpretive services to assist at discharge as needed  - Refer to Case Management Department for coordinating discharge planning if the patient needs post-hospital services based on physician/advanced practitioner order or complex needs related to functional status, cognitive ability, or social support system  Outcome: Progressing     Problem: Knowledge Deficit  Goal: Patient/family/caregiver demonstrates understanding of disease process, treatment plan, medications, and discharge instructions  Description: Complete learning assessment and assess knowledge base    Interventions:  - Provide teaching at level of understanding  - Provide teaching via preferred learning methods  Outcome: Progressing     Problem: GASTROINTESTINAL - ADULT  Goal: Minimal or absence of nausea and/or vomiting  Description: INTERVENTIONS:  - Administer IV fluids if ordered to ensure adequate hydration  - Maintain NPO status until nausea and vomiting are resolved  - Nasogastric tube if ordered  - Administer ordered antiemetic medications as needed  - Provide nonpharmacologic comfort measures as appropriate  - Advance diet as tolerated, if ordered  - Consider nutrition services referral to assist patient with adequate nutrition and appropriate food choices  Outcome: Progressing

## 2020-08-31 VITALS
TEMPERATURE: 98.3 F | HEART RATE: 83 BPM | HEIGHT: 63 IN | SYSTOLIC BLOOD PRESSURE: 125 MMHG | DIASTOLIC BLOOD PRESSURE: 58 MMHG | RESPIRATION RATE: 18 BRPM | OXYGEN SATURATION: 97 % | BODY MASS INDEX: 45.23 KG/M2 | WEIGHT: 255.29 LBS

## 2020-08-31 PROBLEM — D50.9 MICROCYTIC ANEMIA: Status: ACTIVE | Noted: 2020-08-30

## 2020-08-31 LAB
ANION GAP SERPL CALCULATED.3IONS-SCNC: 6 MMOL/L (ref 4–13)
BACTERIA UR CULT: NORMAL
BASOPHILS # BLD AUTO: 0.01 THOUSANDS/ΜL (ref 0–0.1)
BASOPHILS NFR BLD AUTO: 0 % (ref 0–1)
BUN SERPL-MCNC: 11 MG/DL (ref 5–25)
CALCIUM SERPL-MCNC: 8.5 MG/DL (ref 8.3–10.1)
CHLORIDE SERPL-SCNC: 108 MMOL/L (ref 100–108)
CO2 SERPL-SCNC: 25 MMOL/L (ref 21–32)
CREAT SERPL-MCNC: 0.85 MG/DL (ref 0.6–1.3)
EOSINOPHIL # BLD AUTO: 0 THOUSAND/ΜL (ref 0–0.61)
EOSINOPHIL NFR BLD AUTO: 0 % (ref 0–6)
ERYTHROCYTE [DISTWIDTH] IN BLOOD BY AUTOMATED COUNT: 19.6 % (ref 11.6–15.1)
FERRITIN SERPL-MCNC: 12 NG/ML (ref 8–388)
GFR SERPL CREATININE-BSD FRML MDRD: 88 ML/MIN/1.73SQ M
GLUCOSE SERPL-MCNC: 148 MG/DL (ref 65–140)
HCT VFR BLD AUTO: 26.2 % (ref 34.8–46.1)
HCT VFR BLD AUTO: 27 % (ref 34.8–46.1)
HGB BLD-MCNC: 7.8 G/DL (ref 11.5–15.4)
HGB BLD-MCNC: 7.8 G/DL (ref 11.5–15.4)
IMM GRANULOCYTES # BLD AUTO: 0.06 THOUSAND/UL (ref 0–0.2)
IMM GRANULOCYTES NFR BLD AUTO: 1 % (ref 0–2)
IRON SATN MFR SERPL: 3 %
IRON SERPL-MCNC: 11 UG/DL (ref 50–170)
LYMPHOCYTES # BLD AUTO: 1.16 THOUSANDS/ΜL (ref 0.6–4.47)
LYMPHOCYTES NFR BLD AUTO: 9 % (ref 14–44)
MCH RBC QN AUTO: 20.5 PG (ref 26.8–34.3)
MCHC RBC AUTO-ENTMCNC: 29.8 G/DL (ref 31.4–37.4)
MCV RBC AUTO: 69 FL (ref 82–98)
MONOCYTES # BLD AUTO: 0.52 THOUSAND/ΜL (ref 0.17–1.22)
MONOCYTES NFR BLD AUTO: 4 % (ref 4–12)
NEUTROPHILS # BLD AUTO: 11.22 THOUSANDS/ΜL (ref 1.85–7.62)
NEUTS SEG NFR BLD AUTO: 86 % (ref 43–75)
NRBC BLD AUTO-RTO: 0 /100 WBCS
PLATELET # BLD AUTO: 260 THOUSANDS/UL (ref 149–390)
PMV BLD AUTO: 10.3 FL (ref 8.9–12.7)
POTASSIUM SERPL-SCNC: 4.2 MMOL/L (ref 3.5–5.3)
RBC # BLD AUTO: 3.81 MILLION/UL (ref 3.81–5.12)
SODIUM SERPL-SCNC: 139 MMOL/L (ref 136–145)
TIBC SERPL-MCNC: 350 UG/DL (ref 250–450)
WBC # BLD AUTO: 12.97 THOUSAND/UL (ref 4.31–10.16)

## 2020-08-31 PROCEDURE — 83540 ASSAY OF IRON: CPT | Performed by: INTERNAL MEDICINE

## 2020-08-31 PROCEDURE — 85018 HEMOGLOBIN: CPT | Performed by: INTERNAL MEDICINE

## 2020-08-31 PROCEDURE — 85025 COMPLETE CBC W/AUTO DIFF WBC: CPT | Performed by: UROLOGY

## 2020-08-31 PROCEDURE — 85014 HEMATOCRIT: CPT | Performed by: INTERNAL MEDICINE

## 2020-08-31 PROCEDURE — 83550 IRON BINDING TEST: CPT | Performed by: INTERNAL MEDICINE

## 2020-08-31 PROCEDURE — 99225 PR SBSQ OBSERVATION CARE/DAY 25 MINUTES: CPT | Performed by: PHYSICIAN ASSISTANT

## 2020-08-31 PROCEDURE — 80048 BASIC METABOLIC PNL TOTAL CA: CPT | Performed by: UROLOGY

## 2020-08-31 PROCEDURE — 99217 PR OBSERVATION CARE DISCHARGE MANAGEMENT: CPT | Performed by: INTERNAL MEDICINE

## 2020-08-31 PROCEDURE — 82728 ASSAY OF FERRITIN: CPT | Performed by: INTERNAL MEDICINE

## 2020-08-31 RX ORDER — DOCUSATE SODIUM 100 MG/1
100 CAPSULE, LIQUID FILLED ORAL 3 TIMES DAILY
Qty: 90 CAPSULE | Refills: 0 | Status: SHIPPED | OUTPATIENT
Start: 2020-08-31 | End: 2020-10-06 | Stop reason: CLARIF

## 2020-08-31 RX ORDER — TAMSULOSIN HYDROCHLORIDE 0.4 MG/1
0.4 CAPSULE ORAL
Qty: 30 CAPSULE | Refills: 0 | Status: SHIPPED | OUTPATIENT
Start: 2020-08-31 | End: 2020-10-06 | Stop reason: CLARIF

## 2020-08-31 RX ORDER — OXYBUTYNIN CHLORIDE 5 MG/1
5 TABLET ORAL 2 TIMES DAILY PRN
Qty: 60 TABLET | Refills: 0 | Status: ON HOLD | OUTPATIENT
Start: 2020-08-31 | End: 2020-10-06 | Stop reason: CLARIF

## 2020-08-31 RX ORDER — ONDANSETRON 4 MG/1
4 TABLET, ORALLY DISINTEGRATING ORAL EVERY 6 HOURS PRN
Qty: 20 TABLET | Refills: 0 | Status: SHIPPED | OUTPATIENT
Start: 2020-08-31 | End: 2022-07-13

## 2020-08-31 RX ORDER — FERROUS SULFATE TAB EC 324 MG (65 MG FE EQUIVALENT) 324 (65 FE) MG
324 TABLET DELAYED RESPONSE ORAL
Qty: 60 TABLET | Refills: 0 | Status: SHIPPED | OUTPATIENT
Start: 2020-08-31 | End: 2022-07-13

## 2020-08-31 RX ORDER — SULFAMETHOXAZOLE AND TRIMETHOPRIM 800; 160 MG/1; MG/1
1 TABLET ORAL EVERY 12 HOURS SCHEDULED
Qty: 10 TABLET | Refills: 0 | Status: SHIPPED | OUTPATIENT
Start: 2020-08-31 | End: 2020-09-05

## 2020-08-31 RX ORDER — OXYCODONE HYDROCHLORIDE AND ACETAMINOPHEN 5; 325 MG/1; MG/1
1 TABLET ORAL EVERY 4 HOURS PRN
Qty: 8 TABLET | Refills: 0 | Status: SHIPPED | OUTPATIENT
Start: 2020-08-31 | End: 2020-09-05

## 2020-08-31 RX ADMIN — IRON SUCROSE 200 MG: 20 INJECTION, SOLUTION INTRAVENOUS at 16:12

## 2020-08-31 RX ADMIN — CEFTRIAXONE 1000 MG: 1 INJECTION, POWDER, FOR SOLUTION INTRAMUSCULAR; INTRAVENOUS at 04:06

## 2020-08-31 RX ADMIN — SODIUM CHLORIDE 100 ML/HR: 0.9 INJECTION, SOLUTION INTRAVENOUS at 00:28

## 2020-08-31 NOTE — CASE MANAGEMENT
Patient unable to afford medications  Patient does not have insurance  Scripts were sent to [address]  CM sent TT requesting urology to send to [facility] instead  CM completing the indigent form  Medications $47 41, patient will have to pay out of pocket for the colace and percocet  CM made patient and RN at bedside aware  CM explained medication at [facility]  No other CM needs at this time  Patient has transportation home

## 2020-08-31 NOTE — DISCHARGE SUMMARY
Discharge- Socorro Clark 1985, 38 y o  female MRN: [id number]    Unit/Bed#: E5 -01 Encounter: [id number]    Primary Care Provider: No primary care provider on file  Date and time admitted to hospital: 8/30/2020  2:46 AM        * Hydronephrosis with urinary obstruction due to ureteral calculus  Assessment & Plan  Severe hydro on ct a/p 2* 6mm ureteral stone  urology consultation follow-up appreciated  Status post cystoscopy and left stent placement  Further outpatient urology follow-up    Microcytic anemia  Assessment & Plan  Microcytic anemia secondary to iron deficiency anemia  Iron 11, ferritin 12  Hemoglobin 7 8  No evidence of active bleeding  Will give Venofer while inpatient and discharged on ferrous sulfate    Pyelonephritis  Assessment & Plan  Urine culture revealed E coli  Urologist sent prescription for Bactrim      Transition of Care Discharge Summary - [facility]  Internal Medicine    Patient Information: Socorro Clark 38 y o  female MRN: [id number]  Unit/Bed#: E5 [facility]-01 Encounter: [id number]    Discharging Physician / Practitioner: Hanh Maguire MD  PCP: No primary care provider on file  Admission Date: 8/30/2020  Discharge Date: 08/31/20    Disposition:      Other: home      Reason for Admission: abdominal pain    Discharge Diagnoses:     Principal Problem:    Hydronephrosis with urinary obstruction due to ureteral calculus  Active Problems:    Pyelonephritis    Microcytic anemia    Obesity, Class III, BMI 40-49 9 (morbid obesity) ([address] )  Resolved Problems:    * No resolved hospital problems  *      Consultations During Hospital Stay:  · Urology    Procedures Performed:     · Cystoscopy and stent placement    Medication Adjustments and Discharge Medications:  · Medication Dosing Tapers - Please refer to Discharge Medication List for details on any medication dosing tapers (if applicable to patient)    · Discharge Medication List: See after visit summary for reconciled discharge medications  Wound Care Recommendations:  When applicable, please see wound care section of After Visit Summary  Diet Recommendations at Discharge:  Diet -        Diet Orders   (From admission, onward)             Start     Ordered    08/30/20 0811  Diet Regular; Regular House  (ED Bridging Orders Panel)  Diet effective now     Comments:  Diet is to start in the PACU, after surgery   Question Answer Comment   Diet Type Regular    Regular Regular House        08/30/20 0811              Fluid Restriction - No Fluid Restriction at Discharge  Significant Findings / Test Results:     CT reveals1  No significant change in the position of 6 mm obstructing calculus in the region of the left UPJ/proximal ureter  There is now severe left hydronephrosis and new diffuse left-sided perinephric stranding  Superimposed ascending infection is not   excluded  Urology consultation is recommended      · 2   2 mm nonobstructing right renal calculus  Hospital Course:     Fabiana Pendleton is a 38 y o  female patient who originally presented to the hospital on 8/30/2020 due to left flank pain, polyuria, urinary urgency, frequency and dysuria  Found to have 6 mm ureteral stone with pyelonephritis, urology consulted  Patient underwent cystoscopy and left ureteral stent placement  She was given ceftriaxone, urine culture revealed E coli  Her symptoms have improved, she has been afebrile  Discussed with Urology, patient stable for discharge home today with outpatient Urology follow-up for definitive stone surgery after clearance of her current UTI  Urology has center prescriptions including Bactrim  Please see above problem list for further details        Condition at Discharge: good     Discharge Day Visit / Exam:     Subjective:  Patient seen examined at bedside, currently denies any complaints    Vitals: Blood Pressure: 125/58 (08/31/20 0722)  Pulse: 83 (08/31/20 0722)  Temperature: 98 3 °F (36 8 °C) (08/31/20 7989)  Temp Source: Temporal (08/31/20 4593)  Respirations: 18 (08/31/20 0722)  Height: 5' 3" (160 cm) (08/30/20 0814)  Weight - Scale: 116 kg (255 lb 4 7 oz) (08/30/20 0251)  SpO2: 97 % (08/31/20 0722)    Physical Exam:    Constitutional: Patient is oriented to person, place and time, no acute distress  HEENT:  Normocephalic, atraumatic  Cardiovascular: Normal S1S2, RRR, No murmurs/rubs/gallops appreciated  Pulmonary:  Bilateral air entry, No rhonchi/rales/wheezing appreciated  Abdominal: Soft, Bowel sounds present, Non-tender, Non-distended  Extremities:  No cyanosis, clubbing or edema  Neurological: Cranial nerves II-XII grossly intact, sensation intact, otherwise no focal neurological symptoms  Discharge instructions/Information to patient and family:   See after visit summary section titled Discharge Instructions for information provided to patient and family  Planned Readmission:  No      Discharge Statement:  I spent 35 minutes discharging the patient  This time was spent on the day of discharge  I had direct contact with the patient on the day of discharge  Greater than 50% of the total time was spent examining patient, answering all patient questions, arranging and discussing plan of care with patient as well as directly providing post-discharge instructions  Additional time then spent on discharge activities      ** Please Note: This note has been constructed using a voice recognition system **

## 2020-08-31 NOTE — PROGRESS NOTES
Progress Note - Urology  Tamara Mott 1985, 38 y o  female MRN: [id number]    Unit/Bed#: E5 -01 Encounter: [id number]      Assessment and plan:  Elis Ortez is postop day one cystoscopy, left retrograde pyelogram and ureteral stent insertion for an obstructing left 6 mm UPJ stone with urinary tract infection/pyelonephritis  Urine culture grew mixed contaminants, a culture collected from the left renal pelvis was sent and is in process  Afebrile overnight  Pain is well controlled  Tolerating full diet  Voiding well on her own, no blood  Admits to a bit of frequency/urgency as expected  Her white blood cell count did increase from 5-12 postoperatively  Her creatinine has improved from one down to 0 8  She understands the need for definitive stone surgery after clearance of her current urinary tract infection  Will plan to see her in the office and to arrange for subsequent surgery in about 2 weeks  She is otherwise appropriate for discharge home on empiric PO antibiotics  Our team will follow culture sensitivities  Urology will sign off but remain available for any further inpatient needs  Please feel free to call with questions or concerns  Review of Systems   Constitutional: Negative for activity change, appetite change, chills, fever and unexpected weight change  HENT: Negative  Respiratory: Negative  Negative for shortness of breath  Cardiovascular: Negative  Negative for chest pain  Gastrointestinal: Negative for abdominal pain, diarrhea, nausea and vomiting  Endocrine: Negative  Genitourinary: Positive for frequency  Negative for decreased urine volume, difficulty urinating, dysuria, flank pain, hematuria and urgency  Musculoskeletal: Negative for back pain and gait problem  Skin: Negative  Allergic/Immunologic: Negative  Neurological: Negative  Hematological: Negative for adenopathy  Does not bruise/bleed easily         Objective:  Vitals: Blood pressure 125/58, pulse 83, temperature 98 3 °F (36 8 °C), temperature source Temporal, resp  rate 18, height 5' 3" (1 6 m), weight 116 kg (255 lb 4 7 oz), last menstrual period 2020, SpO2 97 %  ,Body mass index is 45 22 kg/m²  Intake/Output Summary (Last 24 hours) at 2020 1317  Last data filed at 2020 0026  Gross per 24 hour   Intake 6883 33 ml   Output --   Net 6883 33 ml     Invasive Devices     Peripheral Intravenous Line            Peripheral IV 20 Left Antecubital 1 day          Drain            Ureteral Drain/Stent Left ureter 6 Fr  1 day                Physical Exam  Vitals signs and nursing note reviewed  Constitutional:       General: She is not in acute distress  Appearance: She is well-developed  She is not diaphoretic  HENT:      Head: Normocephalic and atraumatic  Cardiovascular:      Rate and Rhythm: Normal rate and regular rhythm  Pulmonary:      Effort: Pulmonary effort is normal       Breath sounds: Normal breath sounds  Abdominal:      General: Bowel sounds are normal       Palpations: Abdomen is soft  Skin:     General: Skin is warm  Capillary Refill: Capillary refill takes less than 2 seconds  Neurological:      Mental Status: She is alert and oriented to person, place, and time  Gait: Gait normal    Psychiatric:         Speech: Speech normal          Behavior: Behavior normal          History:    Past Medical History:   Diagnosis Date    Asthma      Past Surgical History:   Procedure Laterality Date     SECTION      URETERAL STENT PLACEMENT Left 2020    Procedure: INSERTION STENT URETERAL;  Surgeon: Arsenio Rosenberg MD;  Location: [facility];  Service: Urology     History reviewed  No pertinent family history    Social History     Socioeconomic History    Marital status: Single     Spouse name: None    Number of children: None    Years of education: None    Highest education level: None   Occupational History    None Social Needs    Financial resource strain: None    Food insecurity     Worry: None     Inability: None    Transportation needs     Medical: None     Non-medical: None   Tobacco Use    Smoking status: Never Smoker    Smokeless tobacco: Never Used   Substance and Sexual Activity    Alcohol use: No    Drug use: No    Sexual activity: None   Lifestyle    Physical activity     Days per week: None     Minutes per session: None    Stress: None   Relationships    Social connections     Talks on phone: None     Gets together: None     Attends Yarsani service: None     Active member of club or organization: None     Attends meetings of clubs or organizations: None     Relationship status: None    Intimate partner violence     Fear of current or ex partner: None     Emotionally abused: None     Physically abused: None     Forced sexual activity: None   Other Topics Concern    None   Social History Narrative    None       Labs:  Recent Labs     08/30/20  0259 08/31/20  0440   WBC 5 85 12 97*     Recent Labs     08/30/20  0259 08/31/20  0440 08/31/20  1119   HGB 8 1* 7 8* 7 8*       Recent Labs     08/30/20  0259 08/31/20  0440   CREATININE 1 09 0 85         Vera Mazariegos PA-C  Date: 8/31/2020 Time: 1:17 PM

## 2020-08-31 NOTE — PLAN OF CARE
Problem: Potential for Falls  Goal: Patient will remain free of falls  Description: INTERVENTIONS:  - Assess patient frequently for physical needs  -  Identify cognitive and physical deficits and behaviors that affect risk of falls    -  [place] fall precautions as indicated by assessment   - Educate patient/family on patient safety including physical limitations  - Instruct patient to call for assistance with activity based on assessment  - Modify environment to reduce risk of injury  - Consider OT/PT consult to assist with strengthening/mobility  Outcome: Progressing     Problem: PAIN - ADULT  Goal: Verbalizes/displays adequate comfort level or baseline comfort level  Description: Interventions:  - Encourage patient to monitor pain and request assistance  - Assess pain using appropriate pain scale  - Administer analgesics based on type and severity of pain and evaluate response  - Implement non-pharmacological measures as appropriate and evaluate response  - Consider cultural and social influences on pain and pain management  - Notify physician/advanced practitioner if interventions unsuccessful or patient reports new pain  Outcome: Progressing     Problem: INFECTION - ADULT  Goal: Absence or prevention of progression during hospitalization  Description: INTERVENTIONS:  - Assess and monitor for signs and symptoms of infection  - Monitor lab/diagnostic results  - Monitor all insertion sites, i e  indwelling lines, tubes, and drains  - Monitor endotracheal if appropriate and nasal secretions for changes in amount and color  - [place] appropriate cooling/warming therapies per order  - Administer medications as ordered  - Instruct and encourage patient and family to use good hand hygiene technique  - Identify and instruct in appropriate isolation precautions for identified infection/condition  Outcome: Progressing  Goal: Absence of fever/infection during neutropenic period  Description: INTERVENTIONS:  - Monitor WBC    Outcome: Progressing     Problem: SAFETY ADULT  Goal: Patient will remain free of falls  Description: INTERVENTIONS:  - Assess patient frequently for physical needs  -  Identify cognitive and physical deficits and behaviors that affect risk of falls    -  [place] fall precautions as indicated by assessment   - Educate patient/family on patient safety including physical limitations  - Instruct patient to call for assistance with activity based on assessment  - Modify environment to reduce risk of injury  - Consider OT/PT consult to assist with strengthening/mobility  Outcome: Progressing  Goal: Maintain or return to baseline ADL function  Description: INTERVENTIONS:  -  Assess patient's ability to carry out ADLs; assess patient's baseline for ADL function and identify physical deficits which impact ability to perform ADLs (bathing, care of mouth/teeth, toileting, grooming, dressing, etc )  - Assess/evaluate cause of self-care deficits   - Assess range of motion  - Assess patient's mobility; develop plan if impaired  - Assess patient's need for assistive devices and provide as appropriate  - Encourage maximum independence but intervene and supervise when necessary  - Involve family in performance of ADLs  - Assess for home care needs following discharge   - Consider OT consult to assist with ADL evaluation and planning for discharge  - Provide patient education as appropriate  Outcome: Progressing  Goal: Maintain or return mobility status to optimal level  Description: INTERVENTIONS:  - Assess patient's baseline mobility status (ambulation, transfers, stairs, etc )    - Identify cognitive and physical deficits and behaviors that affect mobility  - Identify mobility aids required to assist with transfers and/or ambulation (gait belt, sit-to-stand, lift, walker, cane, etc )  - [place] fall precautions as indicated by assessment  - Record patient progress and toleration of activity level on Mobility SBAR; progress patient to next Phase/Stage  - Instruct patient to call for assistance with activity based on assessment  - Consider rehabilitation consult to assist with strengthening/weightbearing, etc   Outcome: Progressing     Problem: DISCHARGE PLANNING  Goal: Discharge to home or other facility with appropriate resources  Description: INTERVENTIONS:  - Identify barriers to discharge w/patient and caregiver  - Arrange for needed discharge resources and transportation as appropriate  - Identify discharge learning needs (meds, wound care, etc )  - Arrange for interpretive services to assist at discharge as needed  - Refer to Case Management Department for coordinating discharge planning if the patient needs post-hospital services based on physician/advanced practitioner order or complex needs related to functional status, cognitive ability, or social support system  Outcome: Progressing     Problem: Knowledge Deficit  Goal: Patient/family/caregiver demonstrates understanding of disease process, treatment plan, medications, and discharge instructions  Description: Complete learning assessment and assess knowledge base    Interventions:  - Provide teaching at level of understanding  - Provide teaching via preferred learning methods  Outcome: Progressing     Problem: GASTROINTESTINAL - ADULT  Goal: Minimal or absence of nausea and/or vomiting  Description: INTERVENTIONS:  - Administer IV fluids if ordered to ensure adequate hydration  - Maintain NPO status until nausea and vomiting are resolved  - Nasogastric tube if ordered  - Administer ordered antiemetic medications as needed  - Provide nonpharmacologic comfort measures as appropriate  - Advance diet as tolerated, if ordered  - Consider nutrition services referral to assist patient with adequate nutrition and appropriate food choices  Outcome: Progressing

## 2020-08-31 NOTE — ASSESSMENT & PLAN NOTE
Microcytic anemia secondary to iron deficiency anemia  Iron 11, ferritin 12  Hemoglobin 7 8  No evidence of active bleeding  Will give Venofer while inpatient and discharged on ferrous sulfate

## 2020-08-31 NOTE — CASE MANAGEMENT
Patient reviewed  Patient here with ureteral stone followed by urology  Patient is postop day one cystoscopy, left retrograde pyelogram and ureteral stent insertion for an obstructing left 6 mm UPJ stone with urinary tract infection/pyelonephritis  Patient on IV antibiotics, monitoring H&H  No PT/OT orders  CM will continue to follow

## 2020-09-01 LAB — BACTERIA UR CULT: ABNORMAL

## 2020-09-02 NOTE — TELEPHONE ENCOUNTER
Called patient and her daughter picked up the phone  I asked if she could please inform her mother that I am trying to reach in regards to sched procedure  Our main number was given

## 2020-09-03 NOTE — TELEPHONE ENCOUNTER
Attempted to call patient and her daughter answered the phone confirming that her mother wants to proceed with surgery for 9/29  I did advise that I would mail them a packet and that I will call tomorrow with a  to go over protocol for surgery  She agrees

## 2020-09-04 ENCOUNTER — PREP FOR PROCEDURE (OUTPATIENT)
Dept: UROLOGY | Facility: CLINIC | Age: 35
End: 2020-09-04

## 2020-09-04 DIAGNOSIS — N20.0 CALCULUS OF KIDNEY: Primary | ICD-10-CM

## 2020-09-04 NOTE — TELEPHONE ENCOUNTER
Attempted to call patient there was no answer  I left a msg to cb and informed them I am mailing her surgical packet

## 2020-09-10 NOTE — TELEPHONE ENCOUNTER
Spoke with patients daughter and asked if they had receive their surgical packet, as of yet they have not  I did inform her it should be arriving by this weekend/early next week  I also told her to please take her mom to a [facility] to have [address] [place] done  I will follow-up with them next week to make sure this has been done and that they received packet

## 2020-09-16 NOTE — TELEPHONE ENCOUNTER
Attempted to reach patient to advise UCX needs to be done ASAP, there was no answer and I did not get a machine  I will try again later

## 2020-09-17 NOTE — TELEPHONE ENCOUNTER
Called and spoke with daughter and advised that her mother has not done her UCX for PAT and if this is not done ASAP it can result in them canceling her mothers surgery for 9/29  She stated she is going tomorrow to take her, I informed her it can not be later than tomorrow it has to be done  I will follow-up on Monday to see if it has been completed

## 2020-09-18 ENCOUNTER — APPOINTMENT (OUTPATIENT)
Dept: LAB | Facility: HOSPITAL | Age: 35
End: 2020-09-18
Attending: UROLOGY
Payer: COMMERCIAL

## 2020-09-18 DIAGNOSIS — N20.0 CALCULUS OF KIDNEY: ICD-10-CM

## 2020-09-18 PROCEDURE — 87086 URINE CULTURE/COLONY COUNT: CPT

## 2020-09-20 LAB — BACTERIA UR CULT: NORMAL

## 2020-09-25 NOTE — PRE-PROCEDURE INSTRUCTIONS
Pre-Surgery Instructions:   Medication Instructions    docusate sodium (COLACE) 100 mg capsule Instructed patient per Anesthesia Guidelines   ferrous sulfate 324 (65 Fe) mg Instructed patient per Anesthesia Guidelines   ondansetron (ZOFRAN-ODT) 4 mg disintegrating tablet Instructed patient per Anesthesia Guidelines   oxybutynin (DITROPAN) 5 mg tablet Instructed patient per Anesthesia Guidelines   tamsulosin (FLOMAX) 0 4 mg Instructed patient per Anesthesia Guidelines  Instructed has no medications to take the morning of surgery  No aspirin or NSAIDs from now until after surgery

## 2020-09-28 ENCOUNTER — ANESTHESIA EVENT (OUTPATIENT)
Dept: PERIOP | Facility: HOSPITAL | Age: 35
End: 2020-09-28
Payer: COMMERCIAL

## 2020-09-28 PROCEDURE — NC001 PR NO CHARGE: Performed by: UROLOGY

## 2020-09-28 NOTE — H&P
HISTORY AND PHYSICAL  ? ? Patient Name: Bharat Ibrahim  Patient MRN: [id number]  Attending Provider: Ramakrishna Lee MD  Service: Urology  Chief Complaint    Left renal pelvis stone, stent in place, prior UTI    HPI   Bharat Ibrahim is a 38 y o  female with stent placed one month ago for 6 mm obstructing left UPJ stone  Infection at that time  Recent culture negative  I plan cysto, left ureteroscopy, laser stone, stent  Potential risks and complications discussed, and informed consent was given by the patient  Medications  Meds/Allergies   No current facility-administered medications for this encounter  Cannot display prior to admission medications because the patient has not been admitted in this contact  No current facility-administered medications for this encounter       Current Outpatient Medications:     docusate sodium (COLACE) 100 mg capsule, Take 1 capsule (100 mg total) by mouth 3 (three) times a day for 7 days, Disp: 90 capsule, Rfl: 0    ferrous sulfate 324 (65 Fe) mg, Take 1 tablet (324 mg total) by mouth 2 (two) times a day before meals, Disp: 60 tablet, Rfl: 0    ondansetron (ZOFRAN-ODT) 4 mg disintegrating tablet, Take 1 tablet (4 mg total) by mouth every 6 (six) hours as needed for nausea or vomiting, Disp: 20 tablet, Rfl: 0    oxybutynin (DITROPAN) 5 mg tablet, Take 1 tablet (5 mg total) by mouth 2 (two) times a day as needed (stent colic and pain), Disp: 60 tablet, Rfl: 0    tamsulosin (FLOMAX) 0 4 mg, Take 1 capsule (0 4 mg total) by mouth daily with dinner, Disp: 30 capsule, Rfl: 0  Review of Systems  10 point review of systems negative except as noted in HPI  Allergies  No Known Allergies  PMH  Past Medical History:   Diagnosis Date    Asthma     Kidney stone      Past surgical history  Past Surgical History:   Procedure Laterality Date     SECTION      x 2    FL RETROGRADE PYELOGRAM  2020    URETERAL STENT PLACEMENT Left 2020    Procedure: INSERTION STENT URETERAL;  Surgeon: Jessica Deleon MD;  Location: [facility];  Service: Urology     Social history  Social History     Tobacco Use    Smoking status: Never Smoker    Smokeless tobacco: Never Used   Substance Use Topics    Alcohol use: No    Drug use: No     ?  Physical Exam     vs  General appearance: alert and oriented, in no acute distress  Head: Normocephalic, without obvious abnormality, atraumatic  Eyes: conjunctivae/corneas clear  PERRL, EOM's intact  Fundi benign  Throat: lips, mucosa, and tongue normal; teeth and gums normal  Neck: no adenopathy, no carotid bruit, no JVD, supple, symmetrical, trachea midline and thyroid not enlarged, symmetric, no tenderness/mass/nodules  Back: symmetric, no curvature  ROM normal  No CVA tenderness    Lungs: clear to auscultation bilaterally  Heart: regular rate and rhythm, S1, S2 normal, no murmur, click, rub or gallop  Abdomen: soft, non-tender; bowel sounds normal; no masses,  no organomegaly  Extremities: extremities normal, warm and well-perfused; no cyanosis, clubbing, or edema  Neurologic: Grossly normal  Nayely Castillo MD

## 2020-09-29 ENCOUNTER — ANESTHESIA (OUTPATIENT)
Dept: PERIOP | Facility: HOSPITAL | Age: 35
End: 2020-09-29
Payer: COMMERCIAL

## 2020-09-29 ENCOUNTER — APPOINTMENT (OUTPATIENT)
Dept: RADIOLOGY | Facility: HOSPITAL | Age: 35
End: 2020-09-29
Payer: COMMERCIAL

## 2020-09-29 ENCOUNTER — HOSPITAL ENCOUNTER (OUTPATIENT)
Facility: HOSPITAL | Age: 35
Setting detail: OUTPATIENT SURGERY
Discharge: HOME | End: 2020-09-29
Attending: UROLOGY | Admitting: UROLOGY
Payer: COMMERCIAL

## 2020-09-29 VITALS
SYSTOLIC BLOOD PRESSURE: 122 MMHG | RESPIRATION RATE: 20 BRPM | DIASTOLIC BLOOD PRESSURE: 66 MMHG | HEIGHT: 63 IN | TEMPERATURE: 97.7 F | BODY MASS INDEX: 43.75 KG/M2 | HEART RATE: 72 BPM | WEIGHT: 246.91 LBS | OXYGEN SATURATION: 99 %

## 2020-09-29 VITALS — HEART RATE: 100 BPM

## 2020-09-29 DIAGNOSIS — N20.0 NEPHROLITHIASIS: ICD-10-CM

## 2020-09-29 LAB
EXT PREGNANCY TEST URINE: NEGATIVE
EXT. CONTROL: NORMAL

## 2020-09-29 PROCEDURE — C2617 STENT, NON-COR, TEM W/O DEL: HCPCS | Performed by: UROLOGY

## 2020-09-29 PROCEDURE — C1894 INTRO/SHEATH, NON-LASER: HCPCS | Performed by: UROLOGY

## 2020-09-29 PROCEDURE — C1769 GUIDE WIRE: HCPCS | Performed by: UROLOGY

## 2020-09-29 PROCEDURE — 52356 CYSTO/URETERO W/LITHOTRIPSY: CPT | Performed by: UROLOGY

## 2020-09-29 PROCEDURE — 81025 URINE PREGNANCY TEST: CPT | Performed by: ANESTHESIOLOGY

## 2020-09-29 PROCEDURE — NC001 PR NO CHARGE: Performed by: UROLOGY

## 2020-09-29 PROCEDURE — 74420 UROGRAPHY RTRGR +-KUB: CPT

## 2020-09-29 DEVICE — VARIABLE LENGTH INJECTION STENT SET
Type: IMPLANTABLE DEVICE | Site: URETER | Status: FUNCTIONAL
Brand: CONTOUR VL™ INJECTION STENT SET

## 2020-09-29 RX ORDER — MAGNESIUM HYDROXIDE 1200 MG/15ML
LIQUID ORAL AS NEEDED
Status: DISCONTINUED | OUTPATIENT
Start: 2020-09-29 | End: 2020-09-29 | Stop reason: HOSPADM

## 2020-09-29 RX ORDER — FENTANYL CITRATE/PF 50 MCG/ML
50 SYRINGE (ML) INJECTION
Status: DISCONTINUED | OUTPATIENT
Start: 2020-09-29 | End: 2020-09-29 | Stop reason: HOSPADM

## 2020-09-29 RX ORDER — CEPHALEXIN 500 MG/1
500 CAPSULE ORAL EVERY 12 HOURS SCHEDULED
Qty: 10 CAPSULE | Refills: 0 | Status: SHIPPED | OUTPATIENT
Start: 2020-09-29 | End: 2020-10-04

## 2020-09-29 RX ORDER — PROPOFOL 10 MG/ML
INJECTION, EMULSION INTRAVENOUS AS NEEDED
Status: DISCONTINUED | OUTPATIENT
Start: 2020-09-29 | End: 2020-09-29

## 2020-09-29 RX ORDER — ONDANSETRON 2 MG/ML
4 INJECTION INTRAMUSCULAR; INTRAVENOUS ONCE AS NEEDED
Status: DISCONTINUED | OUTPATIENT
Start: 2020-09-29 | End: 2020-09-29 | Stop reason: HOSPADM

## 2020-09-29 RX ORDER — ONDANSETRON 2 MG/ML
INJECTION INTRAMUSCULAR; INTRAVENOUS AS NEEDED
Status: DISCONTINUED | OUTPATIENT
Start: 2020-09-29 | End: 2020-09-29

## 2020-09-29 RX ORDER — DEXAMETHASONE SODIUM PHOSPHATE 4 MG/ML
INJECTION, SOLUTION INTRA-ARTICULAR; INTRALESIONAL; INTRAMUSCULAR; INTRAVENOUS; SOFT TISSUE AS NEEDED
Status: DISCONTINUED | OUTPATIENT
Start: 2020-09-29 | End: 2020-09-29

## 2020-09-29 RX ORDER — GABAPENTIN 300 MG/1
300 CAPSULE ORAL ONCE
Status: DISCONTINUED | OUTPATIENT
Start: 2020-09-29 | End: 2020-09-29 | Stop reason: HOSPADM

## 2020-09-29 RX ORDER — KETOROLAC TROMETHAMINE 30 MG/ML
INJECTION, SOLUTION INTRAMUSCULAR; INTRAVENOUS AS NEEDED
Status: DISCONTINUED | OUTPATIENT
Start: 2020-09-29 | End: 2020-09-29

## 2020-09-29 RX ORDER — CEFAZOLIN SODIUM 2 G/50ML
2000 SOLUTION INTRAVENOUS ONCE
Status: DISCONTINUED | OUTPATIENT
Start: 2020-09-29 | End: 2020-09-29 | Stop reason: HOSPADM

## 2020-09-29 RX ORDER — MIDAZOLAM HYDROCHLORIDE 2 MG/2ML
INJECTION, SOLUTION INTRAMUSCULAR; INTRAVENOUS AS NEEDED
Status: DISCONTINUED | OUTPATIENT
Start: 2020-09-29 | End: 2020-09-29

## 2020-09-29 RX ORDER — MEPERIDINE HYDROCHLORIDE 25 MG/ML
12.5 INJECTION INTRAMUSCULAR; INTRAVENOUS; SUBCUTANEOUS ONCE AS NEEDED
Status: DISCONTINUED | OUTPATIENT
Start: 2020-09-29 | End: 2020-09-29 | Stop reason: HOSPADM

## 2020-09-29 RX ORDER — SODIUM CHLORIDE 9 MG/ML
125 INJECTION, SOLUTION INTRAVENOUS CONTINUOUS
Status: DISCONTINUED | OUTPATIENT
Start: 2020-09-29 | End: 2020-09-29 | Stop reason: HOSPADM

## 2020-09-29 RX ORDER — HYDROMORPHONE HCL/PF 1 MG/ML
0.5 SYRINGE (ML) INJECTION
Status: DISCONTINUED | OUTPATIENT
Start: 2020-09-29 | End: 2020-09-29 | Stop reason: HOSPADM

## 2020-09-29 RX ORDER — OXYCODONE HYDROCHLORIDE AND ACETAMINOPHEN 5; 325 MG/1; MG/1
2 TABLET ORAL EVERY 4 HOURS PRN
Status: DISCONTINUED | OUTPATIENT
Start: 2020-09-29 | End: 2020-09-29 | Stop reason: HOSPADM

## 2020-09-29 RX ORDER — CEFAZOLIN SODIUM 2 G/50ML
2000 SOLUTION INTRAVENOUS ONCE
Status: COMPLETED | OUTPATIENT
Start: 2020-09-29 | End: 2020-09-29

## 2020-09-29 RX ORDER — ACETAMINOPHEN 325 MG/1
975 TABLET ORAL ONCE
Status: DISCONTINUED | OUTPATIENT
Start: 2020-09-29 | End: 2020-09-29 | Stop reason: HOSPADM

## 2020-09-29 RX ORDER — HYDROCODONE BITARTRATE AND ACETAMINOPHEN 5; 325 MG/1; MG/1
1-2 TABLET ORAL EVERY 6 HOURS PRN
Qty: 20 TABLET | Refills: 0 | Status: SHIPPED | OUTPATIENT
Start: 2020-09-29 | End: 2020-10-09

## 2020-09-29 RX ORDER — OXYCODONE HYDROCHLORIDE AND ACETAMINOPHEN 5; 325 MG/1; MG/1
1 TABLET ORAL EVERY 4 HOURS PRN
Status: DISCONTINUED | OUTPATIENT
Start: 2020-09-29 | End: 2020-09-29 | Stop reason: HOSPADM

## 2020-09-29 RX ORDER — FENTANYL CITRATE 50 UG/ML
INJECTION, SOLUTION INTRAMUSCULAR; INTRAVENOUS AS NEEDED
Status: DISCONTINUED | OUTPATIENT
Start: 2020-09-29 | End: 2020-09-29

## 2020-09-29 RX ORDER — LIDOCAINE HYDROCHLORIDE 10 MG/ML
INJECTION, SOLUTION EPIDURAL; INFILTRATION; INTRACAUDAL; PERINEURAL AS NEEDED
Status: DISCONTINUED | OUTPATIENT
Start: 2020-09-29 | End: 2020-09-29

## 2020-09-29 RX ADMIN — FENTANYL CITRATE 50 MCG: 50 INJECTION, SOLUTION INTRAMUSCULAR; INTRAVENOUS at 07:24

## 2020-09-29 RX ADMIN — FENTANYL CITRATE 50 MCG: 50 INJECTION, SOLUTION INTRAMUSCULAR; INTRAVENOUS at 07:37

## 2020-09-29 RX ADMIN — PROPOFOL 200 MG: 10 INJECTION, EMULSION INTRAVENOUS at 07:20

## 2020-09-29 RX ADMIN — KETOROLAC TROMETHAMINE 30 MG: 30 INJECTION, SOLUTION INTRAMUSCULAR at 07:56

## 2020-09-29 RX ADMIN — MIDAZOLAM 2 MG: 1 INJECTION INTRAMUSCULAR; INTRAVENOUS at 07:13

## 2020-09-29 RX ADMIN — SODIUM CHLORIDE: 0.9 INJECTION, SOLUTION INTRAVENOUS at 08:00

## 2020-09-29 RX ADMIN — CEFAZOLIN SODIUM 2000 MG: 2 SOLUTION INTRAVENOUS at 07:10

## 2020-09-29 RX ADMIN — LIDOCAINE HYDROCHLORIDE 100 MG: 10 INJECTION, SOLUTION EPIDURAL; INFILTRATION; INTRACAUDAL; PERINEURAL at 07:20

## 2020-09-29 RX ADMIN — ONDANSETRON 4 MG: 2 INJECTION INTRAMUSCULAR; INTRAVENOUS at 07:50

## 2020-09-29 RX ADMIN — DEXAMETHASONE SODIUM PHOSPHATE 4 MG: 4 INJECTION, SOLUTION INTRAMUSCULAR; INTRAVENOUS at 07:22

## 2020-09-29 RX ADMIN — SODIUM CHLORIDE 125 ML/HR: 0.9 INJECTION, SOLUTION INTRAVENOUS at 06:05

## 2020-09-29 RX ADMIN — IOHEXOL 6 ML: 240 INJECTION, SOLUTION INTRATHECAL; INTRAVASCULAR; INTRAVENOUS; ORAL at 07:50

## 2020-09-29 RX ADMIN — FENTANYL CITRATE 50 MCG: 50 INJECTION, SOLUTION INTRAMUSCULAR; INTRAVENOUS at 07:42

## 2020-09-29 NOTE — ANESTHESIA POSTPROCEDURE EVALUATION
Post-Op Assessment Note    CV Status:  Stable    Pain management: adequate     Mental Status:  Alert and awake   Hydration Status:  Euvolemic   PONV Controlled:  Controlled   Airway Patency:  Patent      Post Op Vitals Reviewed: Yes      Staff: Anesthesiologist         No complications documented      /66 (09/29/20 0925)    Temp      Pulse 72 (09/29/20 0925)   Resp 20 (09/29/20 0925)    SpO2 99 % (09/29/20 0925)

## 2020-09-29 NOTE — ANESTHESIA PREPROCEDURE EVALUATION
Procedure:  CYSTOSCOPY URETEROSCOPY WITH LITHOTRIPSY HOLMIUM LASER, RETROGRADE PYELOGRAM AND INSERTION STENT URETERAL (Left Bladder)    Relevant Problems   /RENAL   (+) Hydronephrosis with urinary obstruction due to ureteral calculus      HEMATOLOGY   (+) Microcytic anemia        Physical Exam    Airway    Mallampati score: I  TM Distance: <3 FB  Neck ROM: full     Dental       Cardiovascular  Rhythm: regular, Rate: normal, Cardiovascular exam normal    Pulmonary  Pulmonary exam normal     Other Findings        Anesthesia Plan  ASA Score- 2     Anesthesia Type- general with ASA Monitors  Additional Monitors:   Airway Plan: LMA  Plan Factors-    Chart reviewed  Induction- intravenous      Postoperative Plan-     Informed Consent-

## 2020-09-29 NOTE — DISCHARGE INSTRUCTIONS
ALL YOUR  PREVIOUS MEDS ARE THE SAME  NEW MEDS: hydrocodone for pain  Cephalexin antibiotic    BE CAREFUL NOT TO PULL THE STRING  EXPECT SOME BLOOD IN URINE, BURNING, FREQUENT URINATION

## 2020-09-29 NOTE — OP NOTE
OPERATIVE REPORT  PATIENT NAME: Mode Kelly    :  1985  MRN: [id number]  Pt Location: [facility]OM 03    SURGERY DATE: 2020    Surgeon(s) and Role:     * Mark Giraldo MD - Primary    Preop Diagnosis:  Nephrolithiasis [N20 0]  Encounter for adjustment of ureteral stent [Z46 6]    Post-Op Diagnosis Codes:     * Nephrolithiasis [N20 0]     * Encounter for adjustment of ureteral stent [Z46 6]    Procedure(s) (LRB):  CYSTOSCOPY URETEROSCOPY WITH LITHOTRIPSY HOLMIUM LASER, RETROGRADE PYELOGRAM AND INSERTION STENT EXCHANGE URETERAL (Left)    Specimen(s):  * No specimens in log *    Estimated Blood Loss:   Minimal    Drains:  Ureteral Drain/Stent Left ureter 6 Fr  (Active)   Site Assessment Clean; Intact 20   Dressing Status Open to air 20   Securement Method Tape;Securing device (Describe) 20   Number of days: 0       Anesthesia Type:   General    Operative Indications:  Nephrolithiasis [N20 0]  Encounter for adjustment of ureteral stent [Z46 6]      Operative Findings:  6 mm stone in left mid pole calyx    Complications:   None    Procedure and Technique:      PLAN FOR STENT:  Will be removed with string next Monday or Tuesday      The patient was brought into the OR, properly identified and positioned on the table  General anesthesia was induced, and she was prepped using chlorhexidine solution and draped in lithotomy position  Compression boots were employed  Intravenous antibiotic was administered  An appropriate time-out was performed  The 22F scope was introduced in the urethra, which showed no strictures  The bladder was inspected and had no lesions, stones, or tumors , but did have mild inflammation from the prior stent  The left ureteral stent was grasped, and withdrawn  Two wires were placed, and an access sheath was placed over one wire  The flexible scope was placed thru a sheath and advanced to stone, in the midpole calyx   The Holmium laser was used on various settings to break up stone into small particles  Care was taken not to laser tissue  All particles appeared small enough to pass around the stent  The scope was removed from the ureter, and the cystoscope was used to place a 6F Contour VL stent in the ureter, with the upper coils in the renal pelvis, and the distal coil in the bladder  Retrograde pyelogram on that side confirmed good position and no extravasation of contrast      The patient was awaken from anesthesia and taken to the PACU in good condition       I was present for the entire procedure    Patient Disposition:  PACU     SIGNATURE: Ramakrishna Lee MD  DATE: September 29, 2020  TIME: 12:59 PM

## 2020-09-29 NOTE — DISCHARGE SUMMARY
Discharge Summary - Laura Majano 38 y o  female MRN: [id number]    Admission Date: 9/29/2020     Admitting Diagnosis: Nephrolithiasis [N20 0]  Encounter for adjustment of ureteral stent [Z46 6]    Procedures Performed: left ureteroscopy, laser stone, stent    Patient underwent planned outpt surgery and recovered without complication  Discharged in good condition  Medications were prescribed  Pt knows to have office follow-up at the appropriate time  Pt [email or web address] [employer].

## 2020-10-01 ENCOUNTER — TELEPHONE (OUTPATIENT)
Dept: UROLOGY | Facility: MEDICAL CENTER | Age: 35
End: 2020-10-01

## 2020-10-05 ENCOUNTER — CLINICAL SUPPORT (OUTPATIENT)
Dept: UROLOGY | Facility: MEDICAL CENTER | Age: 35
End: 2020-10-05
Payer: COMMERCIAL

## 2020-10-05 VITALS
SYSTOLIC BLOOD PRESSURE: 118 MMHG | HEIGHT: 63 IN | DIASTOLIC BLOOD PRESSURE: 78 MMHG | WEIGHT: 247 LBS | HEART RATE: 88 BPM | BODY MASS INDEX: 43.77 KG/M2

## 2020-10-05 DIAGNOSIS — N23 RENAL COLIC ON LEFT SIDE: ICD-10-CM

## 2020-10-05 DIAGNOSIS — N20.0 KIDNEY STONE: Primary | ICD-10-CM

## 2020-10-05 PROCEDURE — 99211 OFF/OP EST MAY X REQ PHY/QHP: CPT

## 2020-10-06 ENCOUNTER — APPOINTMENT (EMERGENCY)
Dept: CT IMAGING | Facility: HOSPITAL | Age: 35
DRG: 720 | End: 2020-10-06
Payer: COMMERCIAL

## 2020-10-06 ENCOUNTER — HOSPITAL ENCOUNTER (INPATIENT)
Facility: HOSPITAL | Age: 35
LOS: 2 days | Discharge: HOME | DRG: 720 | End: 2020-10-08
Attending: EMERGENCY MEDICINE | Admitting: INTERNAL MEDICINE
Payer: COMMERCIAL

## 2020-10-06 DIAGNOSIS — R53.81 MALAISE: ICD-10-CM

## 2020-10-06 DIAGNOSIS — R51.9 HEADACHE: ICD-10-CM

## 2020-10-06 DIAGNOSIS — Z96.0 S/P URETERAL STENT PLACEMENT: ICD-10-CM

## 2020-10-06 DIAGNOSIS — N12 PYELONEPHRITIS: ICD-10-CM

## 2020-10-06 DIAGNOSIS — R10.9 FLANK PAIN: Primary | ICD-10-CM

## 2020-10-06 PROBLEM — A41.9 SEPSIS SECONDARY TO UTI (HCC): Status: ACTIVE | Noted: 2020-10-06

## 2020-10-06 PROBLEM — E87.1 HYPONATREMIA: Status: ACTIVE | Noted: 2020-10-06

## 2020-10-06 PROBLEM — E66.9 OBESITY: Status: ACTIVE | Noted: 2020-10-06

## 2020-10-06 PROBLEM — N17.9 AKI (ACUTE KIDNEY INJURY) (HCC): Status: ACTIVE | Noted: 2020-10-06

## 2020-10-06 PROBLEM — N39.0 SEPSIS SECONDARY TO UTI (HCC): Status: ACTIVE | Noted: 2020-10-06

## 2020-10-06 LAB
ALBUMIN SERPL BCP-MCNC: 2.7 G/DL (ref 3.5–5)
ALP SERPL-CCNC: 92 U/L (ref 46–116)
ALT SERPL W P-5'-P-CCNC: 38 U/L (ref 12–78)
ANION GAP SERPL CALCULATED.3IONS-SCNC: 6 MMOL/L (ref 4–13)
APTT PPP: 41 SECONDS (ref 23–37)
AST SERPL W P-5'-P-CCNC: 26 U/L (ref 5–45)
ATRIAL RATE: 114 BPM
BACTERIA UR QL AUTO: ABNORMAL /HPF
BASE EX.OXY STD BLDV CALC-SCNC: 56.7 % (ref 60–80)
BASE EXCESS BLDV CALC-SCNC: -0.9 MMOL/L
BASOPHILS # BLD AUTO: 0.03 THOUSANDS/ΜL (ref 0–0.1)
BASOPHILS NFR BLD AUTO: 0 % (ref 0–1)
BILIRUB SERPL-MCNC: 0.36 MG/DL (ref 0.2–1)
BILIRUB UR QL STRIP: NEGATIVE
BUN SERPL-MCNC: 13 MG/DL (ref 5–25)
CALCIUM ALBUM COR SERPL-MCNC: 9.5 MG/DL (ref 8.3–10.1)
CALCIUM SERPL-MCNC: 8.5 MG/DL (ref 8.3–10.1)
CHLORIDE SERPL-SCNC: 98 MMOL/L (ref 100–108)
CLARITY UR: ABNORMAL
CO2 SERPL-SCNC: 26 MMOL/L (ref 21–32)
COLOR UR: ABNORMAL
CREAT SERPL-MCNC: 1.4 MG/DL (ref 0.6–1.3)
EOSINOPHIL # BLD AUTO: 0 THOUSAND/ΜL (ref 0–0.61)
EOSINOPHIL NFR BLD AUTO: 0 % (ref 0–6)
ERYTHROCYTE [DISTWIDTH] IN BLOOD BY AUTOMATED COUNT: 20.9 % (ref 11.6–15.1)
EXT PREG TEST URINE: NORMAL
EXT. CONTROL ED NAV: NORMAL
GFR SERPL CREATININE-BSD FRML MDRD: 48 ML/MIN/1.73SQ M
GLUCOSE SERPL-MCNC: 121 MG/DL (ref 65–140)
GLUCOSE UR STRIP-MCNC: NEGATIVE MG/DL
HCO3 BLDV-SCNC: 24.3 MMOL/L (ref 24–30)
HCT VFR BLD AUTO: 29.2 % (ref 34.8–46.1)
HGB BLD-MCNC: 8.7 G/DL (ref 11.5–15.4)
HGB UR QL STRIP.AUTO: ABNORMAL
IMM GRANULOCYTES # BLD AUTO: 0.22 THOUSAND/UL (ref 0–0.2)
IMM GRANULOCYTES NFR BLD AUTO: 2 % (ref 0–2)
INR PPP: 1.29 (ref 0.84–1.19)
KETONES UR STRIP-MCNC: NEGATIVE MG/DL
LACTATE SERPL-SCNC: 1.5 MMOL/L (ref 0.5–2)
LEUKOCYTE ESTERASE UR QL STRIP: ABNORMAL
LYMPHOCYTES # BLD AUTO: 1.2 THOUSANDS/ΜL (ref 0.6–4.47)
LYMPHOCYTES NFR BLD AUTO: 8 % (ref 14–44)
MCH RBC QN AUTO: 21 PG (ref 26.8–34.3)
MCHC RBC AUTO-ENTMCNC: 29.8 G/DL (ref 31.4–37.4)
MCV RBC AUTO: 71 FL (ref 82–98)
MONOCYTES # BLD AUTO: 1.5 THOUSAND/ΜL (ref 0.17–1.22)
MONOCYTES NFR BLD AUTO: 10 % (ref 4–12)
NEUTROPHILS # BLD AUTO: 11.67 THOUSANDS/ΜL (ref 1.85–7.62)
NEUTS SEG NFR BLD AUTO: 80 % (ref 43–75)
NITRITE UR QL STRIP: NEGATIVE
NON-SQ EPI CELLS URNS QL MICRO: ABNORMAL /HPF
NRBC BLD AUTO-RTO: 0 /100 WBCS
O2 CT BLDV-SCNC: 8 ML/DL
P AXIS: 49 DEGREES
PCO2 BLDV: 42.5 MM HG (ref 42–50)
PH BLDV: 7.38 [PH] (ref 7.3–7.4)
PH UR STRIP.AUTO: 6 [PH]
PLATELET # BLD AUTO: 206 THOUSANDS/UL (ref 149–390)
PMV BLD AUTO: 10.7 FL (ref 8.9–12.7)
PO2 BLDV: 28.4 MM HG (ref 35–45)
POTASSIUM SERPL-SCNC: 3.4 MMOL/L (ref 3.5–5.3)
PR INTERVAL: 148 MS
PROCALCITONIN SERPL-MCNC: 4.64 NG/ML
PROT SERPL-MCNC: 7.5 G/DL (ref 6.4–8.2)
PROT UR STRIP-MCNC: ABNORMAL MG/DL
PROTHROMBIN TIME: 15.8 SECONDS (ref 11.6–14.5)
QRS AXIS: 77 DEGREES
QRSD INTERVAL: 84 MS
QT INTERVAL: 294 MS
QTC INTERVAL: 405 MS
RBC # BLD AUTO: 4.14 MILLION/UL (ref 3.81–5.12)
RBC #/AREA URNS AUTO: ABNORMAL /HPF
SODIUM SERPL-SCNC: 130 MMOL/L (ref 136–145)
SP GR UR STRIP.AUTO: 1.02 (ref 1–1.03)
T WAVE AXIS: 9 DEGREES
UROBILINOGEN UR QL STRIP.AUTO: 1 E.U./DL
VENTRICULAR RATE: 114 BPM
WBC # BLD AUTO: 14.62 THOUSAND/UL (ref 4.31–10.16)
WBC #/AREA URNS AUTO: ABNORMAL /HPF

## 2020-10-06 PROCEDURE — 87040 BLOOD CULTURE FOR BACTERIA: CPT | Performed by: EMERGENCY MEDICINE

## 2020-10-06 PROCEDURE — 74177 CT ABD & PELVIS W/CONTRAST: CPT

## 2020-10-06 PROCEDURE — 36415 COLL VENOUS BLD VENIPUNCTURE: CPT | Performed by: EMERGENCY MEDICINE

## 2020-10-06 PROCEDURE — 85610 PROTHROMBIN TIME: CPT | Performed by: EMERGENCY MEDICINE

## 2020-10-06 PROCEDURE — 96375 TX/PRO/DX INJ NEW DRUG ADDON: CPT

## 2020-10-06 PROCEDURE — 87186 SC STD MICRODIL/AGAR DIL: CPT | Performed by: INTERNAL MEDICINE

## 2020-10-06 PROCEDURE — 87077 CULTURE AEROBIC IDENTIFY: CPT | Performed by: INTERNAL MEDICINE

## 2020-10-06 PROCEDURE — 80053 COMPREHEN METABOLIC PANEL: CPT | Performed by: EMERGENCY MEDICINE

## 2020-10-06 PROCEDURE — 87086 URINE CULTURE/COLONY COUNT: CPT | Performed by: INTERNAL MEDICINE

## 2020-10-06 PROCEDURE — 85730 THROMBOPLASTIN TIME PARTIAL: CPT | Performed by: EMERGENCY MEDICINE

## 2020-10-06 PROCEDURE — 81001 URINALYSIS AUTO W/SCOPE: CPT | Performed by: EMERGENCY MEDICINE

## 2020-10-06 PROCEDURE — 96365 THER/PROPH/DIAG IV INF INIT: CPT

## 2020-10-06 PROCEDURE — 84145 PROCALCITONIN (PCT): CPT | Performed by: EMERGENCY MEDICINE

## 2020-10-06 PROCEDURE — 99285 EMERGENCY DEPT VISIT HI MDM: CPT

## 2020-10-06 PROCEDURE — 82805 BLOOD GASES W/O2 SATURATION: CPT | Performed by: EMERGENCY MEDICINE

## 2020-10-06 PROCEDURE — 93010 ELECTROCARDIOGRAM REPORT: CPT | Performed by: INTERNAL MEDICINE

## 2020-10-06 PROCEDURE — 99285 EMERGENCY DEPT VISIT HI MDM: CPT | Performed by: EMERGENCY MEDICINE

## 2020-10-06 PROCEDURE — 81025 URINE PREGNANCY TEST: CPT | Performed by: EMERGENCY MEDICINE

## 2020-10-06 PROCEDURE — 83605 ASSAY OF LACTIC ACID: CPT | Performed by: EMERGENCY MEDICINE

## 2020-10-06 PROCEDURE — 93005 ELECTROCARDIOGRAM TRACING: CPT

## 2020-10-06 PROCEDURE — 99223 1ST HOSP IP/OBS HIGH 75: CPT | Performed by: INTERNAL MEDICINE

## 2020-10-06 PROCEDURE — 85025 COMPLETE CBC W/AUTO DIFF WBC: CPT | Performed by: EMERGENCY MEDICINE

## 2020-10-06 PROCEDURE — G1004 CDSM NDSC: HCPCS

## 2020-10-06 RX ORDER — ACETAMINOPHEN 325 MG/1
650 TABLET ORAL EVERY 6 HOURS PRN
Status: DISCONTINUED | OUTPATIENT
Start: 2020-10-06 | End: 2020-10-08 | Stop reason: HOSPADM

## 2020-10-06 RX ORDER — FERROUS SULFATE 325(65) MG
325 TABLET ORAL 2 TIMES DAILY WITH MEALS
Status: DISCONTINUED | OUTPATIENT
Start: 2020-10-07 | End: 2020-10-08 | Stop reason: HOSPADM

## 2020-10-06 RX ORDER — HYDROMORPHONE HCL/PF 1 MG/ML
0.5 SYRINGE (ML) INJECTION EVERY 4 HOURS PRN
Status: DISCONTINUED | OUTPATIENT
Start: 2020-10-06 | End: 2020-10-08 | Stop reason: HOSPADM

## 2020-10-06 RX ORDER — KETOROLAC TROMETHAMINE 30 MG/ML
15 INJECTION, SOLUTION INTRAMUSCULAR; INTRAVENOUS ONCE
Status: COMPLETED | OUTPATIENT
Start: 2020-10-06 | End: 2020-10-06

## 2020-10-06 RX ORDER — ONDANSETRON 2 MG/ML
4 INJECTION INTRAMUSCULAR; INTRAVENOUS EVERY 4 HOURS PRN
Status: DISCONTINUED | OUTPATIENT
Start: 2020-10-06 | End: 2020-10-08 | Stop reason: HOSPADM

## 2020-10-06 RX ORDER — ACETAMINOPHEN 325 MG/1
650 TABLET ORAL ONCE
Status: COMPLETED | OUTPATIENT
Start: 2020-10-06 | End: 2020-10-06

## 2020-10-06 RX ORDER — SODIUM CHLORIDE 9 MG/ML
100 INJECTION, SOLUTION INTRAVENOUS CONTINUOUS
Status: DISPENSED | OUTPATIENT
Start: 2020-10-06 | End: 2020-10-08

## 2020-10-06 RX ORDER — OXYCODONE HYDROCHLORIDE 5 MG/1
5 TABLET ORAL EVERY 4 HOURS PRN
Status: DISCONTINUED | OUTPATIENT
Start: 2020-10-06 | End: 2020-10-08 | Stop reason: HOSPADM

## 2020-10-06 RX ORDER — METOCLOPRAMIDE HYDROCHLORIDE 5 MG/ML
5 INJECTION INTRAMUSCULAR; INTRAVENOUS ONCE
Status: COMPLETED | OUTPATIENT
Start: 2020-10-06 | End: 2020-10-06

## 2020-10-06 RX ADMIN — ACETAMINOPHEN 650 MG: 325 TABLET ORAL at 16:14

## 2020-10-06 RX ADMIN — SODIUM CHLORIDE, SODIUM LACTATE, POTASSIUM CHLORIDE, AND CALCIUM CHLORIDE 1000 ML: .6; .31; .03; .02 INJECTION, SOLUTION INTRAVENOUS at 18:38

## 2020-10-06 RX ADMIN — HYDROMORPHONE HYDROCHLORIDE 0.5 MG: 1 INJECTION, SOLUTION INTRAMUSCULAR; INTRAVENOUS; SUBCUTANEOUS at 20:17

## 2020-10-06 RX ADMIN — IOHEXOL 100 ML: 350 INJECTION, SOLUTION INTRAVENOUS at 17:00

## 2020-10-06 RX ADMIN — METOCLOPRAMIDE 5 MG: 5 INJECTION, SOLUTION INTRAMUSCULAR; INTRAVENOUS at 18:54

## 2020-10-06 RX ADMIN — KETOROLAC TROMETHAMINE 15 MG: 30 INJECTION, SOLUTION INTRAMUSCULAR at 16:15

## 2020-10-06 RX ADMIN — SODIUM CHLORIDE 1000 ML: 0.9 INJECTION, SOLUTION INTRAVENOUS at 16:06

## 2020-10-06 RX ADMIN — SODIUM CHLORIDE 100 ML/HR: 0.9 INJECTION, SOLUTION INTRAVENOUS at 21:08

## 2020-10-06 RX ADMIN — ENOXAPARIN SODIUM 40 MG: 40 INJECTION SUBCUTANEOUS at 20:20

## 2020-10-06 RX ADMIN — CEFEPIME HYDROCHLORIDE 2000 MG: 2 INJECTION, POWDER, FOR SOLUTION INTRAVENOUS at 16:20

## 2020-10-07 ENCOUNTER — APPOINTMENT (INPATIENT)
Dept: ULTRASOUND IMAGING | Facility: HOSPITAL | Age: 35
DRG: 720 | End: 2020-10-07
Payer: COMMERCIAL

## 2020-10-07 LAB
ALBUMIN SERPL BCP-MCNC: 2.2 G/DL (ref 3.5–5)
ALP SERPL-CCNC: 80 U/L (ref 46–116)
ALT SERPL W P-5'-P-CCNC: 33 U/L (ref 12–78)
ANION GAP SERPL CALCULATED.3IONS-SCNC: 6 MMOL/L (ref 4–13)
AST SERPL W P-5'-P-CCNC: 18 U/L (ref 5–45)
BILIRUB SERPL-MCNC: 0.28 MG/DL (ref 0.2–1)
BUN SERPL-MCNC: 10 MG/DL (ref 5–25)
CALCIUM ALBUM COR SERPL-MCNC: 9.5 MG/DL (ref 8.3–10.1)
CALCIUM SERPL-MCNC: 8.1 MG/DL (ref 8.3–10.1)
CHLORIDE SERPL-SCNC: 105 MMOL/L (ref 100–108)
CO2 SERPL-SCNC: 24 MMOL/L (ref 21–32)
CREAT SERPL-MCNC: 1.14 MG/DL (ref 0.6–1.3)
ERYTHROCYTE [DISTWIDTH] IN BLOOD BY AUTOMATED COUNT: 20.6 % (ref 11.6–15.1)
GFR SERPL CREATININE-BSD FRML MDRD: 62 ML/MIN/1.73SQ M
GLUCOSE SERPL-MCNC: 108 MG/DL (ref 65–140)
HCT VFR BLD AUTO: 26.7 % (ref 34.8–46.1)
HGB BLD-MCNC: 7.8 G/DL (ref 11.5–15.4)
MCH RBC QN AUTO: 20.5 PG (ref 26.8–34.3)
MCHC RBC AUTO-ENTMCNC: 29.2 G/DL (ref 31.4–37.4)
MCV RBC AUTO: 70 FL (ref 82–98)
PLATELET # BLD AUTO: 174 THOUSANDS/UL (ref 149–390)
POTASSIUM SERPL-SCNC: 3.6 MMOL/L (ref 3.5–5.3)
PROCALCITONIN SERPL-MCNC: 3.68 NG/ML
PROT SERPL-MCNC: 6.5 G/DL (ref 6.4–8.2)
RBC # BLD AUTO: 3.8 MILLION/UL (ref 3.81–5.12)
SODIUM SERPL-SCNC: 135 MMOL/L (ref 136–145)
WBC # BLD AUTO: 9.62 THOUSAND/UL (ref 4.31–10.16)

## 2020-10-07 PROCEDURE — 99232 SBSQ HOSP IP/OBS MODERATE 35: CPT | Performed by: PHYSICIAN ASSISTANT

## 2020-10-07 PROCEDURE — 76770 US EXAM ABDO BACK WALL COMP: CPT

## 2020-10-07 PROCEDURE — 84145 PROCALCITONIN (PCT): CPT | Performed by: EMERGENCY MEDICINE

## 2020-10-07 PROCEDURE — 80053 COMPREHEN METABOLIC PANEL: CPT | Performed by: INTERNAL MEDICINE

## 2020-10-07 PROCEDURE — 99255 IP/OBS CONSLTJ NEW/EST HI 80: CPT | Performed by: UROLOGY

## 2020-10-07 PROCEDURE — 85027 COMPLETE CBC AUTOMATED: CPT | Performed by: INTERNAL MEDICINE

## 2020-10-07 RX ORDER — TAMSULOSIN HYDROCHLORIDE 0.4 MG/1
0.4 CAPSULE ORAL
Status: DISCONTINUED | OUTPATIENT
Start: 2020-10-07 | End: 2020-10-08 | Stop reason: HOSPADM

## 2020-10-07 RX ORDER — KETOROLAC TROMETHAMINE 30 MG/ML
15 INJECTION, SOLUTION INTRAMUSCULAR; INTRAVENOUS ONCE
Status: COMPLETED | OUTPATIENT
Start: 2020-10-07 | End: 2020-10-07

## 2020-10-07 RX ADMIN — FERROUS SULFATE TAB 325 MG (65 MG ELEMENTAL FE) 325 MG: 325 (65 FE) TAB at 07:52

## 2020-10-07 RX ADMIN — FERROUS SULFATE TAB 325 MG (65 MG ELEMENTAL FE) 325 MG: 325 (65 FE) TAB at 17:08

## 2020-10-07 RX ADMIN — SODIUM CHLORIDE 100 ML/HR: 0.9 INJECTION, SOLUTION INTRAVENOUS at 11:31

## 2020-10-07 RX ADMIN — CEFEPIME HYDROCHLORIDE 1000 MG: 1 INJECTION, POWDER, FOR SOLUTION INTRAMUSCULAR; INTRAVENOUS at 06:18

## 2020-10-07 RX ADMIN — ACETAMINOPHEN 650 MG: 325 TABLET ORAL at 20:06

## 2020-10-07 RX ADMIN — ENOXAPARIN SODIUM 40 MG: 40 INJECTION SUBCUTANEOUS at 08:25

## 2020-10-07 RX ADMIN — CEFEPIME HYDROCHLORIDE 1000 MG: 1 INJECTION, POWDER, FOR SOLUTION INTRAMUSCULAR; INTRAVENOUS at 17:08

## 2020-10-07 RX ADMIN — TAMSULOSIN HYDROCHLORIDE 0.4 MG: 0.4 CAPSULE ORAL at 17:08

## 2020-10-07 RX ADMIN — SODIUM CHLORIDE 1000 ML: 0.9 INJECTION, SOLUTION INTRAVENOUS at 10:30

## 2020-10-07 RX ADMIN — ACETAMINOPHEN 650 MG: 325 TABLET ORAL at 07:52

## 2020-10-07 RX ADMIN — KETOROLAC TROMETHAMINE 15 MG: 30 INJECTION, SOLUTION INTRAMUSCULAR at 12:05

## 2020-10-08 VITALS
HEART RATE: 76 BPM | DIASTOLIC BLOOD PRESSURE: 71 MMHG | WEIGHT: 223.33 LBS | OXYGEN SATURATION: 100 % | SYSTOLIC BLOOD PRESSURE: 123 MMHG | BODY MASS INDEX: 39.56 KG/M2 | RESPIRATION RATE: 18 BRPM | TEMPERATURE: 99.7 F

## 2020-10-08 LAB
ANION GAP SERPL CALCULATED.3IONS-SCNC: 8 MMOL/L (ref 4–13)
BASOPHILS # BLD AUTO: 0.02 THOUSANDS/ΜL (ref 0–0.1)
BASOPHILS NFR BLD AUTO: 0 % (ref 0–1)
BUN SERPL-MCNC: 7 MG/DL (ref 5–25)
CALCIUM SERPL-MCNC: 8.4 MG/DL (ref 8.3–10.1)
CHLORIDE SERPL-SCNC: 108 MMOL/L (ref 100–108)
CO2 SERPL-SCNC: 24 MMOL/L (ref 21–32)
CREAT SERPL-MCNC: 0.94 MG/DL (ref 0.6–1.3)
EOSINOPHIL # BLD AUTO: 0.02 THOUSAND/ΜL (ref 0–0.61)
EOSINOPHIL NFR BLD AUTO: 0 % (ref 0–6)
ERYTHROCYTE [DISTWIDTH] IN BLOOD BY AUTOMATED COUNT: 20.6 % (ref 11.6–15.1)
GFR SERPL CREATININE-BSD FRML MDRD: 78 ML/MIN/1.73SQ M
GLUCOSE SERPL-MCNC: 117 MG/DL (ref 65–140)
HCT VFR BLD AUTO: 24.2 % (ref 34.8–46.1)
HGB BLD-MCNC: 7.4 G/DL (ref 11.5–15.4)
IMM GRANULOCYTES # BLD AUTO: 0.01 THOUSAND/UL (ref 0–0.2)
IMM GRANULOCYTES NFR BLD AUTO: 0 % (ref 0–2)
LYMPHOCYTES # BLD AUTO: 1.69 THOUSANDS/ΜL (ref 0.6–4.47)
LYMPHOCYTES NFR BLD AUTO: 29 % (ref 14–44)
MCH RBC QN AUTO: 21 PG (ref 26.8–34.3)
MCHC RBC AUTO-ENTMCNC: 30.6 G/DL (ref 31.4–37.4)
MCV RBC AUTO: 69 FL (ref 82–98)
MONOCYTES # BLD AUTO: 0.6 THOUSAND/ΜL (ref 0.17–1.22)
MONOCYTES NFR BLD AUTO: 11 % (ref 4–12)
NEUTROPHILS # BLD AUTO: 3.4 THOUSANDS/ΜL (ref 1.85–7.62)
NEUTS SEG NFR BLD AUTO: 60 % (ref 43–75)
NRBC BLD AUTO-RTO: 0 /100 WBCS
PLATELET # BLD AUTO: 232 THOUSANDS/UL (ref 149–390)
PMV BLD AUTO: 10.9 FL (ref 8.9–12.7)
POTASSIUM SERPL-SCNC: 3.9 MMOL/L (ref 3.5–5.3)
RBC # BLD AUTO: 3.53 MILLION/UL (ref 3.81–5.12)
SODIUM SERPL-SCNC: 140 MMOL/L (ref 136–145)
WBC # BLD AUTO: 5.74 THOUSAND/UL (ref 4.31–10.16)

## 2020-10-08 PROCEDURE — 99239 HOSP IP/OBS DSCHRG MGMT >30: CPT | Performed by: PHYSICIAN ASSISTANT

## 2020-10-08 PROCEDURE — 80048 BASIC METABOLIC PNL TOTAL CA: CPT | Performed by: PHYSICIAN ASSISTANT

## 2020-10-08 PROCEDURE — 85025 COMPLETE CBC W/AUTO DIFF WBC: CPT | Performed by: PHYSICIAN ASSISTANT

## 2020-10-08 RX ORDER — CIPROFLOXACIN 500 MG/1
500 TABLET, FILM COATED ORAL EVERY 12 HOURS SCHEDULED
Qty: 10 TABLET | Refills: 0 | Status: SHIPPED | OUTPATIENT
Start: 2020-10-08 | End: 2020-10-13

## 2020-10-08 RX ORDER — BUTALBITAL, ACETAMINOPHEN AND CAFFEINE 50; 325; 40 MG/1; MG/1; MG/1
1 TABLET ORAL ONCE
Status: COMPLETED | OUTPATIENT
Start: 2020-10-08 | End: 2020-10-08

## 2020-10-08 RX ADMIN — BUTALBITAL, ACETAMINOPHEN AND CAFFEINE 1 TABLET: 50; 325; 40 TABLET ORAL at 11:46

## 2020-10-08 RX ADMIN — FERROUS SULFATE TAB 325 MG (65 MG ELEMENTAL FE) 325 MG: 325 (65 FE) TAB at 09:19

## 2020-10-08 RX ADMIN — OXYCODONE HYDROCHLORIDE 5 MG: 5 TABLET ORAL at 09:20

## 2020-10-08 RX ADMIN — CEFEPIME HYDROCHLORIDE 1000 MG: 1 INJECTION, POWDER, FOR SOLUTION INTRAMUSCULAR; INTRAVENOUS at 05:36

## 2020-10-08 RX ADMIN — ENOXAPARIN SODIUM 40 MG: 40 INJECTION SUBCUTANEOUS at 09:19

## 2020-10-09 LAB — BACTERIA UR CULT: ABNORMAL

## 2020-10-11 LAB
BACTERIA BLD CULT: NORMAL
BACTERIA BLD CULT: NORMAL

## 2020-10-13 ENCOUNTER — LAB (OUTPATIENT)
Dept: LAB | Facility: HOSPITAL | Age: 35
End: 2020-10-13
Attending: UROLOGY
Payer: COMMERCIAL

## 2020-10-13 PROCEDURE — 84392 ASSAY OF URINE SULFATE: CPT

## 2020-10-13 PROCEDURE — 84300 ASSAY OF URINE SODIUM: CPT

## 2020-10-13 PROCEDURE — 84560 ASSAY OF URINE/URIC ACID: CPT

## 2020-10-13 PROCEDURE — 82131 AMINO ACIDS SINGLE QUANT: CPT

## 2020-10-13 PROCEDURE — 82140 ASSAY OF AMMONIA: CPT

## 2020-10-13 PROCEDURE — 82340 ASSAY OF CALCIUM IN URINE: CPT

## 2020-10-13 PROCEDURE — 82507 ASSAY OF CITRATE: CPT

## 2020-10-13 PROCEDURE — 83935 ASSAY OF URINE OSMOLALITY: CPT

## 2020-10-13 PROCEDURE — 84133 ASSAY OF URINE POTASSIUM: CPT

## 2020-10-13 PROCEDURE — 83945 ASSAY OF OXALATE: CPT

## 2020-10-13 PROCEDURE — 82436 ASSAY OF URINE CHLORIDE: CPT

## 2020-10-13 PROCEDURE — 81003 URINALYSIS AUTO W/O SCOPE: CPT

## 2020-10-13 PROCEDURE — 84105 ASSAY OF URINE PHOSPHORUS: CPT

## 2020-10-13 PROCEDURE — 82570 ASSAY OF URINE CREATININE: CPT

## 2020-10-13 PROCEDURE — 83735 ASSAY OF MAGNESIUM: CPT

## 2020-10-15 ENCOUNTER — NURSE TRIAGE (OUTPATIENT)
Dept: OTHER | Facility: OTHER | Age: 35
End: 2020-10-15

## 2020-10-21 LAB
AMMONIA 24H UR-MRATE: 20 MEQ/24 HR
AMMONIA UR-SCNC: ABNORMAL UG/DL
CA H2 PHOS DIHYD CRY URNS QL MICRO: 1.17 RATIO (ref 0–3)
CALCIUM 24H UR-MCNC: 19.5 MG/DL
CALCIUM 24H UR-MRATE: 234 MG/24 HR (ref 100–300)
CHLORIDE 24H UR-SCNC: 99 MMOL/L
CHLORIDE 24H UR-SRATE: 119 MMOL/24 HR (ref 110–250)
CITRATE 24H UR-MCNC: 348 MG/L
CITRATE 24H UR-MRATE: 418 MG/24 HR (ref 320–1240)
COM CRY STONE QL IR: 6.33 RATIO (ref 0–6)
CREAT 24H UR-MCNC: 132.6 MG/DL
CREAT 24H UR-MRATE: 1591.2 MG/24 HR (ref 800–1800)
CYSTINE 24H UR-MCNC: 25.17 MG/L
CYSTINE 24H UR-MRATE: 30.2 MG/24 HR (ref 10–100)
MAGNESIUM 24H UR-MRATE: 119 MG/24 HR (ref 12–293)
MAGNESIUM UR-MCNC: 9.9 MG/DL
NA URATE CRY STONE QL IR: 2.69 RATIO (ref 0–4)
OSMOLALITY UR: 618 MOSMOL/KG (ref 300–900)
OXALATE 24H UR-MRATE: 19 MG/24 HR (ref 4–31)
OXALATE UR-MCNC: 16 MG/L
PH 24H UR: 5.4 [PH]
PHOSPHATE 24H UR-MCNC: 88.6 MG/DL
PHOSPHATE 24H UR-MRATE: 1063.2 MG/24 HR (ref 400–1300)
PLEASE NOTE (STONE RISK): ABNORMAL
POTASSIUM 24H UR-SCNC: 36.8 MMOL/24 HR (ref 25–125)
POTASSIUM UR-SCNC: 30.7 MMOL/L
PRESERVED URINE: 1200 ML/24 HR (ref 600–1600)
SODIUM 24H UR-SCNC: 123 MMOL/L
SODIUM 24H UR-SRATE: 148 MMOL/24 HR (ref 39–258)
SPECIMEN VOL 24H UR: 1200 ML/24 HR (ref 600–1600)
SULFATE 24H UR-MCNC: 23 MEQ/24 HR (ref 0–30)
SULFATE UR-MCNC: 19 MEQ/L
TRI-PHOS CRY STONE MICRO: 0.01 RATIO (ref 0–1)
URATE 24H UR-MCNC: 32.3 MG/DL
URATE 24H UR-MRATE: 388 MG/24 HR (ref 250–750)
URATE DIHYD CRY STONE QL IR: 3.44 RATIO (ref 0–1.2)

## 2021-05-06 ENCOUNTER — APPOINTMENT (EMERGENCY)
Dept: CT IMAGING | Facility: HOSPITAL | Age: 36
End: 2021-05-06
Payer: COMMERCIAL

## 2021-05-06 ENCOUNTER — HOSPITAL ENCOUNTER (EMERGENCY)
Facility: HOSPITAL | Age: 36
Discharge: HOME | End: 2021-05-06
Attending: EMERGENCY MEDICINE | Admitting: EMERGENCY MEDICINE
Payer: COMMERCIAL

## 2021-05-06 VITALS
SYSTOLIC BLOOD PRESSURE: 136 MMHG | RESPIRATION RATE: 16 BRPM | TEMPERATURE: 98.7 F | WEIGHT: 248.02 LBS | HEART RATE: 83 BPM | DIASTOLIC BLOOD PRESSURE: 82 MMHG | OXYGEN SATURATION: 100 % | BODY MASS INDEX: 43.93 KG/M2

## 2021-05-06 DIAGNOSIS — R10.9 LEFT SIDED ABDOMINAL PAIN: ICD-10-CM

## 2021-05-06 DIAGNOSIS — R10.9 LEFT FLANK PAIN: Primary | ICD-10-CM

## 2021-05-06 LAB
ALBUMIN SERPL BCP-MCNC: 3.3 G/DL (ref 3.5–5)
ALP SERPL-CCNC: 80 U/L (ref 46–116)
ALT SERPL W P-5'-P-CCNC: 25 U/L (ref 12–78)
ANION GAP SERPL CALCULATED.3IONS-SCNC: 7 MMOL/L (ref 4–13)
AST SERPL W P-5'-P-CCNC: 18 U/L (ref 5–45)
BACTERIA UR QL AUTO: ABNORMAL /HPF
BASOPHILS # BLD AUTO: 0.02 THOUSANDS/ΜL (ref 0–0.1)
BASOPHILS NFR BLD AUTO: 0 % (ref 0–1)
BILIRUB SERPL-MCNC: <0.1 MG/DL (ref 0.2–1)
BILIRUB UR QL STRIP: NEGATIVE
BUN SERPL-MCNC: 13 MG/DL (ref 5–25)
CALCIUM ALBUM COR SERPL-MCNC: 9.4 MG/DL (ref 8.3–10.1)
CALCIUM SERPL-MCNC: 8.8 MG/DL (ref 8.3–10.1)
CHLORIDE SERPL-SCNC: 106 MMOL/L (ref 100–108)
CLARITY UR: CLEAR
CO2 SERPL-SCNC: 28 MMOL/L (ref 21–32)
COLOR UR: YELLOW
CREAT SERPL-MCNC: 0.85 MG/DL (ref 0.6–1.3)
EOSINOPHIL # BLD AUTO: 0 THOUSAND/ΜL (ref 0–0.61)
EOSINOPHIL NFR BLD AUTO: 0 % (ref 0–6)
ERYTHROCYTE [DISTWIDTH] IN BLOOD BY AUTOMATED COUNT: 19.7 % (ref 11.6–15.1)
EXT PREG TEST URINE: NEGATIVE
EXT. CONTROL ED NAV: NORMAL
GFR SERPL CREATININE-BSD FRML MDRD: 87 ML/MIN/1.73SQ M
GLUCOSE SERPL-MCNC: 89 MG/DL (ref 65–140)
GLUCOSE UR STRIP-MCNC: NEGATIVE MG/DL
HCT VFR BLD AUTO: 32 % (ref 34.8–46.1)
HGB BLD-MCNC: 9.4 G/DL (ref 11.5–15.4)
HGB UR QL STRIP.AUTO: ABNORMAL
IMM GRANULOCYTES # BLD AUTO: 0.02 THOUSAND/UL (ref 0–0.2)
IMM GRANULOCYTES NFR BLD AUTO: 0 % (ref 0–2)
KETONES UR STRIP-MCNC: NEGATIVE MG/DL
LACTATE SERPL-SCNC: 0.6 MMOL/L (ref 0.5–2)
LEUKOCYTE ESTERASE UR QL STRIP: NEGATIVE
LIPASE SERPL-CCNC: 125 U/L (ref 73–393)
LYMPHOCYTES # BLD AUTO: 2.2 THOUSANDS/ΜL (ref 0.6–4.47)
LYMPHOCYTES NFR BLD AUTO: 39 % (ref 14–44)
MCH RBC QN AUTO: 20.7 PG (ref 26.8–34.3)
MCHC RBC AUTO-ENTMCNC: 29.4 G/DL (ref 31.4–37.4)
MCV RBC AUTO: 70 FL (ref 82–98)
MONOCYTES # BLD AUTO: 0.4 THOUSAND/ΜL (ref 0.17–1.22)
MONOCYTES NFR BLD AUTO: 7 % (ref 4–12)
NEUTROPHILS # BLD AUTO: 3 THOUSANDS/ΜL (ref 1.85–7.62)
NEUTS SEG NFR BLD AUTO: 54 % (ref 43–75)
NITRITE UR QL STRIP: NEGATIVE
NON-SQ EPI CELLS URNS QL MICRO: ABNORMAL /HPF
NRBC BLD AUTO-RTO: 0 /100 WBCS
PH UR STRIP.AUTO: 6 [PH]
PLATELET # BLD AUTO: 291 THOUSANDS/UL (ref 149–390)
PMV BLD AUTO: 9.8 FL (ref 8.9–12.7)
POTASSIUM SERPL-SCNC: 4.2 MMOL/L (ref 3.5–5.3)
PROT SERPL-MCNC: 7.5 G/DL (ref 6.4–8.2)
PROT UR STRIP-MCNC: NEGATIVE MG/DL
RBC # BLD AUTO: 4.55 MILLION/UL (ref 3.81–5.12)
RBC #/AREA URNS AUTO: ABNORMAL /HPF
SODIUM SERPL-SCNC: 141 MMOL/L (ref 136–145)
SP GR UR STRIP.AUTO: 1.02 (ref 1–1.03)
UROBILINOGEN UR QL STRIP.AUTO: 0.2 E.U./DL
WBC # BLD AUTO: 5.64 THOUSAND/UL (ref 4.31–10.16)
WBC #/AREA URNS AUTO: ABNORMAL /HPF

## 2021-05-06 PROCEDURE — 99284 EMERGENCY DEPT VISIT MOD MDM: CPT | Performed by: EMERGENCY MEDICINE

## 2021-05-06 PROCEDURE — 80053 COMPREHEN METABOLIC PANEL: CPT | Performed by: EMERGENCY MEDICINE

## 2021-05-06 PROCEDURE — 83690 ASSAY OF LIPASE: CPT | Performed by: EMERGENCY MEDICINE

## 2021-05-06 PROCEDURE — 36415 COLL VENOUS BLD VENIPUNCTURE: CPT | Performed by: EMERGENCY MEDICINE

## 2021-05-06 PROCEDURE — 96361 HYDRATE IV INFUSION ADD-ON: CPT

## 2021-05-06 PROCEDURE — 96375 TX/PRO/DX INJ NEW DRUG ADDON: CPT

## 2021-05-06 PROCEDURE — 81001 URINALYSIS AUTO W/SCOPE: CPT | Performed by: EMERGENCY MEDICINE

## 2021-05-06 PROCEDURE — 96374 THER/PROPH/DIAG INJ IV PUSH: CPT

## 2021-05-06 PROCEDURE — 99284 EMERGENCY DEPT VISIT MOD MDM: CPT

## 2021-05-06 PROCEDURE — 81025 URINE PREGNANCY TEST: CPT | Performed by: EMERGENCY MEDICINE

## 2021-05-06 PROCEDURE — 74176 CT ABD & PELVIS W/O CONTRAST: CPT

## 2021-05-06 PROCEDURE — 83605 ASSAY OF LACTIC ACID: CPT | Performed by: EMERGENCY MEDICINE

## 2021-05-06 PROCEDURE — 85025 COMPLETE CBC W/AUTO DIFF WBC: CPT | Performed by: EMERGENCY MEDICINE

## 2021-05-06 RX ORDER — KETOROLAC TROMETHAMINE 30 MG/ML
15 INJECTION, SOLUTION INTRAMUSCULAR; INTRAVENOUS ONCE
Status: COMPLETED | OUTPATIENT
Start: 2021-05-06 | End: 2021-05-06

## 2021-05-06 RX ORDER — ONDANSETRON 2 MG/ML
4 INJECTION INTRAMUSCULAR; INTRAVENOUS ONCE
Status: COMPLETED | OUTPATIENT
Start: 2021-05-06 | End: 2021-05-06

## 2021-05-06 RX ADMIN — KETOROLAC TROMETHAMINE 15 MG: 30 INJECTION, SOLUTION INTRAMUSCULAR; INTRAVENOUS at 18:06

## 2021-05-06 RX ADMIN — SODIUM CHLORIDE 1000 ML: 0.9 INJECTION, SOLUTION INTRAVENOUS at 18:06

## 2021-05-06 RX ADMIN — ONDANSETRON 4 MG: 2 INJECTION INTRAMUSCULAR; INTRAVENOUS at 18:05

## 2021-05-06 NOTE — Clinical Note
Coty Moore was seen and treated in our emergency department on 5/6/2021  Diagnosis:     Devorah Bustamante  may return to work on return date  She may return on this date: 05/08/2021         If you have any questions or concerns, please don't hesitate to call        Gerson Mancini DO    ______________________________           _______________          _______________  Hospital Representative                              Date                                Time

## 2021-05-06 NOTE — ED PROVIDER NOTES
History  Chief Complaint   Patient presents with    Flank Pain     Pt reports left flank that radiate down leg since yesterday  Pt reports hx of kidney stone and it feels similar to last time  Pt reports 7/10 pain and nausea  Pt also reports urinary frequency  44 yo F presenting for evaluation of 2 days of L flank/abdominal pain/nausea  Pain waxing/waning in severity, mostly to L flank but radiates around to L abdomen and proximal thigh  No a/e factors  Feels similar to prior renal stones  History of several stones, has needed lithotripsy/stenting and been infected in the past  Last hospitalized in 2020  Denies fevers, chills, CP, SOB, changes in stool, urinary complaints, numbness, weakness  MDM: 44 yo F with L flank/abdominal pain/nausea- will treat sx, abdominal labs, CT A/P to assess for stone, UA to r/o pyelo             Prior to Admission Medications   Prescriptions Last Dose Informant Patient Reported?  Taking?   ferrous sulfate 324 (65 Fe) mg Not Taking at Unknown time  No No   Sig: Take 1 tablet (324 mg total) by mouth 2 (two) times a day before meals   Patient not taking: Reported on 2021   ondansetron (ZOFRAN-ODT) 4 mg disintegrating tablet Not Taking at Unknown time  No No   Sig: Take 1 tablet (4 mg total) by mouth every 6 (six) hours as needed for nausea or vomiting   Patient not taking: Reported on 2021      Facility-Administered Medications: None       Past Medical History:   Diagnosis Date    Anemia     Asthma     Kidney stone        Past Surgical History:   Procedure Laterality Date     SECTION      x 2    FL RETROGRADE PYELOGRAM  2020    FL RETROGRADE PYELOGRAM  2020    SC CYSTO/URETERO W/LITHOTRIPSY &INDWELL STENT INSRT Left 2020    Procedure: CYSTOSCOPY URETEROSCOPY WITH LITHOTRIPSY HOLMIUM LASER, RETROGRADE PYELOGRAM AND INSERTION STENT EXCHANGE URETERAL;  Surgeon: Edgardo Dale MD;  Location: [facility] OR;  Service: Urology   [facility] Left 8/30/2020    Procedure: INSERTION STENT URETERAL;  Surgeon: Jaylen Bowers MD;  Location: [facility];  Service: Urology       History reviewed  No pertinent family history  I have reviewed and agree with the history as documented  E-Cigarette/Vaping    E-Cigarette Use Never User      E-Cigarette/Vaping Substances    Nicotine No     THC No     CBD No      Social History     Tobacco Use    Smoking status: Never Smoker    Smokeless tobacco: Never Used   Substance Use Topics    Alcohol use: No    Drug use: No       Review of Systems   Constitutional: Negative for chills, fever and unexpected weight change  HENT: Negative for ear pain, rhinorrhea and sore throat  Eyes: Negative for pain and visual disturbance  Respiratory: Negative for cough and shortness of breath  Cardiovascular: Negative for chest pain and leg swelling  Gastrointestinal: Positive for abdominal pain and nausea  Negative for constipation, diarrhea and vomiting  Endocrine: Negative for polydipsia, polyphagia and polyuria  Genitourinary: Positive for flank pain  Negative for dysuria, frequency, hematuria and urgency  Musculoskeletal: Negative for back pain, myalgias and neck pain  Skin: Negative for color change and rash  Allergic/Immunologic: Negative for environmental allergies and immunocompromised state  Neurological: Negative for dizziness, weakness, light-headedness, numbness and headaches  Hematological: Negative for adenopathy  Does not bruise/bleed easily  Psychiatric/Behavioral: Negative for agitation and confusion  All other systems reviewed and are negative  Physical Exam  Physical Exam  Vitals signs and nursing note reviewed  Constitutional:       Appearance: Normal appearance  She is well-developed  HENT:      Head: Normocephalic and atraumatic        Nose: Nose normal    Eyes:      Conjunctiva/sclera: Conjunctivae normal    Neck:      Musculoskeletal: Normal range of motion and neck supple  Cardiovascular:      Rate and Rhythm: Normal rate and regular rhythm  Heart sounds: Normal heart sounds  Pulmonary:      Effort: Pulmonary effort is normal  No respiratory distress  Breath sounds: Normal breath sounds  No stridor  No wheezing or rales  Chest:      Chest wall: No tenderness  Abdominal:      General: There is no distension  Palpations: Abdomen is soft  Tenderness: There is abdominal tenderness  There is no guarding or rebound  Comments: Tender to palpation LUQ/LLQ, no rebound/guarding  L CVA ttp  No skin changes/rash present   Musculoskeletal:         General: No deformity  Skin:     General: Skin is warm and dry  Findings: No rash  Neurological:      Mental Status: She is alert and oriented to person, place, and time  Motor: No abnormal muscle tone  Coordination: Coordination normal    Psychiatric:         Thought Content:  Thought content normal          Judgment: Judgment normal          Vital Signs  ED Triage Vitals   Temperature Pulse Respirations Blood Pressure SpO2   05/06/21 1655 05/06/21 1652 05/06/21 1652 05/06/21 1652 05/06/21 1652   98 7 °F (37 1 °C) 81 16 146/80 100 %      Temp Source Heart Rate Source Patient Position - Orthostatic VS BP Location FiO2 (%)   05/06/21 1655 05/06/21 1652 05/06/21 1652 05/06/21 1652 --   Oral Monitor Sitting Right arm       Pain Score       05/06/21 1652       7           Vitals:    05/06/21 1652 05/06/21 1903   BP: 146/80 136/82   Pulse: 81 83   Patient Position - Orthostatic VS: Sitting Lying         Visual Acuity      ED Medications  Medications   sodium chloride 0 9 % bolus 1,000 mL (0 mL Intravenous Stopped 5/6/21 1915)   ketorolac (TORADOL) injection 15 mg (15 mg Intravenous Given 5/6/21 1806)   ondansetron (ZOFRAN) injection 4 mg (4 mg Intravenous Given 5/6/21 1805)       Diagnostic Studies  Results Reviewed     Procedure Component Value Units Date/Time    Comprehensive metabolic panel [[id number]]  (Abnormal) Collected: 05/06/21 1757    Lab Status: Final result Specimen: Blood from Arm, Left Updated: 05/06/21 1848     Sodium 141 mmol/L      Potassium 4 2 mmol/L      Chloride 106 mmol/L      CO2 28 mmol/L      ANION GAP 7 mmol/L      BUN 13 mg/dL      Creatinine 0 85 mg/dL      Glucose 89 mg/dL      Calcium 8 8 mg/dL      Corrected Calcium 9 4 mg/dL      AST 18 U/L      ALT 25 U/L      Alkaline Phosphatase 80 U/L      Total Protein 7 5 g/dL      Albumin 3 3 g/dL      Total Bilirubin <0 10 mg/dL      eGFR 87 ml/min/1 73sq m     Narrative:      [place] guidelines for Chronic Kidney Disease (CKD):     Stage 1 with normal or high GFR (GFR > 90 mL/min/1 73 square meters)    Stage 2 Mild CKD (GFR = 60-89 mL/min/1 73 square meters)    Stage 3A Moderate CKD (GFR = 45-59 mL/min/1 73 square meters)    Stage 3B Moderate CKD (GFR = 30-44 mL/min/1 73 square meters)    Stage 4 Severe CKD (GFR = 15-29 mL/min/1 73 square meters)    Stage 5 End Stage CKD (GFR <15 mL/min/1 73 square meters)  Note: GFR calculation is accurate only with a steady state creatinine    Lactic acid, plasma [[id number]]  (Normal) Collected: 05/06/21 1757    Lab Status: Final result Specimen: Blood from Arm, Left Updated: 05/06/21 1838     LACTIC ACID 0 6 mmol/L     Narrative:      Result may be elevated if tourniquet was used during collection      Lipase [[id number]]  (Normal) Collected: 05/06/21 1757    Lab Status: Final result Specimen: Blood from Arm, Left Updated: 05/06/21 1835     Lipase 125 u/L     CBC and differential [[id number]]  (Abnormal) Collected: 05/06/21 1757    Lab Status: Final result Specimen: Blood from Arm, Left Updated: 05/06/21 1815     WBC 5 64 Thousand/uL      RBC 4 55 Million/uL      Hemoglobin 9 4 g/dL      Hematocrit 32 0 %      MCV 70 fL      MCH 20 7 pg      MCHC 29 4 g/dL      RDW 19 7 %      MPV 9 8 fL      Platelets 662 Thousands/uL      nRBC 0 /100 WBCs      Neutrophils Relative 54 %      Immat GRANS % 0 %      Lymphocytes Relative 39 %      Monocytes Relative 7 %      Eosinophils Relative 0 %      Basophils Relative 0 %      Neutrophils Absolute 3 00 Thousands/µL      Immature Grans Absolute 0 02 Thousand/uL      Lymphocytes Absolute 2 20 Thousands/µL      Monocytes Absolute 0 40 Thousand/µL      Eosinophils Absolute 0 00 Thousand/µL      Basophils Absolute 0 02 Thousands/µL     Urine Microscopic [[id number]]  (Abnormal) Collected: 05/06/21 1719    Lab Status: Final result Specimen: Urine, Clean Catch Updated: 05/06/21 1748     RBC, UA 0-1 /hpf      WBC, UA 0-1 /hpf      Epithelial Cells Occasional /hpf      Bacteria, UA Occasional /hpf     UA w Reflex to Microscopic w Reflex to Culture [[id number]]  (Abnormal) Collected: 05/06/21 1719    Lab Status: Final result Specimen: Urine, Clean Catch Updated: 05/06/21 1726     Color, UA Yellow     Clarity, UA Clear     Specific Gravity, UA 1 025     pH, UA 6 0     Leukocytes, UA Negative     Nitrite, UA Negative     Protein, UA Negative mg/dl      Glucose, UA Negative mg/dl      Ketones, UA Negative mg/dl      Urobilinogen, UA 0 2 E U /dl      Bilirubin, UA Negative     Blood, UA Trace-Intact    POCT pregnancy, urine [[id number]]  (Normal) Resulted: 05/06/21 1720    Lab Status: Final result Updated: 05/06/21 1720     EXT PREG TEST UR (Ref: Negative) negative     Control valid                 CT renal stone study abdomen pelvis wo contrast   ED Interpretation by Katharine Sal DO (05/06 1802)   CT ABDOMEN AND PELVIS WITHOUT IV CONTRAST - LOW DOSE RENAL STONE      INDICATION:   L flank/abdominal pain/nausea, history of renal stones      COMPARISON:  None      TECHNIQUE:  Low dose thin section CT examination of the abdomen and pelvis was performed without intravenous or oral contrast according to a protocol specifically designed to evaluate for urinary tract calculus    Axial, sagittal, and coronal 2D   reformatted images were created from the source data and submitted for interpretation  Evaluation for pathology in the abdomen and pelvis that is unrelated to urinary tract calculi is limited       Radiation dose length product (DLP) for this visit:  677 mGy-cm   This examination, like all CT scans performed in the [facility], was performed utilizing techniques to minimize radiation dose exposure, including the use of iterative   reconstruction and automated exposure control       FINDINGS:     RIGHT KIDNEY AND URETER:  There are nonobstructing intrarenal monika   culi measuring on the order of 1 mm  No hydronephrosis or hydroureter      LEFT KIDNEY AND URETER:  No urinary tract calculi  No hydronephrosis or hydroureter      URINARY BLADDER:   Nondistended inadequately evaluated      No significant abnormality in the visualized lung bases      Limited low radiation dose noncontrast CT evaluation demonstrates no clinically significant abnormality of liver, spleen, pancreas, or adrenal glands  No calcified gallstones or gallbladder wall thickening noted  No ascites or bulky lymphadenopathy on this limited noncontrast study  Bowel loops appear unremarkable  Limited evaluation demonstrates no evidence to suggest acute appendicitis  No acute fracture or destructive osseous lesion is identified  Bulbous likely myomatous enlarged uterus      IMPRESSION:     1 mm right mid pole nonobstructing calculus      Correlate with urinalysis for urinary tract infection evaluation      Bulbous likely myomatous enlarged uterus          Final Result by Lianne Antonio MD (05/06 1800)      1 mm right mid pole nonobstructing calculus  Correlate with urinalysis for urinary tract infection evaluation  Bulbous likely myomatous enlarged uterus                     Workstation performed: [id number]                    Procedures  Procedures         ED Course  ED Course as of May 07 0139   Thu May 06, 2021   1722 PREGNANCY TEST URINE: negative   1731 Blood, UA(!): Trace-Intact   1802 1 mm right mid pole nonobstructing calculus        Correlate with urinalysis for urinary tract infection evaluation        Bulbous likely myomatous enlarged uterus  1822 7 4 7 months ago   Hemoglobin(!): 9 4   1902 Pain resolved, patient resting comfortably, reviewed results with patient/daughter at bedside  No current PCP, will give clinic to follow up, return precautions reviewed                                              MDM  Number of Diagnoses or Management Options  Left flank pain:   Left sided abdominal pain:   Diagnosis management comments: 42 yo F with L flank/abdominal pain/nausea, uncertain etiology  ED workup unremarkable, pain resolved at time of discharge  Discussed PCP f/u and return precautions with pt/daughter at bedside       Amount and/or Complexity of Data Reviewed  Clinical lab tests: ordered and reviewed  Tests in the radiology section of CPT®: ordered and reviewed  Tests in the medicine section of CPT®: ordered and reviewed  Review and summarize past medical records: yes  Independent visualization of images, tracings, or specimens: yes        Disposition  Final diagnoses:   Left flank pain   Left sided abdominal pain     Time reflects when diagnosis was documented in both MDM as applicable and the Disposition within this note     Time User Action Codes Description Comment    5/6/2021  7:06 PM Marysol Melina A Add [R10 9] Left flank pain     5/6/2021  7:07 PM Raymundo Chiquito Add [R10 9] Left sided abdominal pain       ED Disposition     ED Disposition Condition Date/Time Comment    Discharge Stable Thu May 6, 2021  7:06 PM Megan Horton discharge to home/self care              Follow-up Information     Follow up With Specialties Details Why Contact Info Additional [address]   [address] [facility], [address] [facility] [address]  [address] [place] [address] [address], [address] [place], [place], [address] Obstetrics and Gynecology Schedule an appointment as soon as possible for a visit in 1 week If symptoms worsen [address], [address] [facility] [address]  Bertrand Samson, [address], [address] [place], [place], [id number] [phone]          Discharge Medication List as of 5/6/2021  7:07 PM      CONTINUE these medications which have NOT CHANGED    Details   ferrous sulfate 324 (65 Fe) mg Take 1 tablet (324 mg total) by mouth 2 (two) times a day before meals, Starting Mon 8/31/2020, Normal      ondansetron (ZOFRAN-ODT) 4 mg disintegrating tablet Take 1 tablet (4 mg total) by mouth every 6 (six) hours as needed for nausea or vomiting, Starting Mon 8/31/2020, Normal           No discharge procedures on file      PDMP Review       Value Time User    PDMP Reviewed  Yes 10/8/2020 11:00 AM Flory Ozuna PA-C          ED Provider  Electronically Signed by           Jaime Mason DO  05/07/21 0139

## 2021-05-06 NOTE — DISCHARGE INSTRUCTIONS
Tylenol and/or Ibuprofen as needed for pain  Follow up with family doctor  Return to ER if any new or worsening symptoms including but not limited to intractable pain/vomiting, passing out, etc

## 2022-07-13 ENCOUNTER — APPOINTMENT (EMERGENCY)
Dept: RADIOLOGY | Facility: HOSPITAL | Age: 37
End: 2022-07-13
Payer: COMMERCIAL

## 2022-07-13 ENCOUNTER — APPOINTMENT (EMERGENCY)
Dept: CT IMAGING | Facility: HOSPITAL | Age: 37
End: 2022-07-13
Payer: COMMERCIAL

## 2022-07-13 ENCOUNTER — HOSPITAL ENCOUNTER (EMERGENCY)
Facility: HOSPITAL | Age: 37
Discharge: HOME | End: 2022-07-13
Attending: EMERGENCY MEDICINE
Payer: COMMERCIAL

## 2022-07-13 VITALS
DIASTOLIC BLOOD PRESSURE: 73 MMHG | HEART RATE: 66 BPM | RESPIRATION RATE: 18 BRPM | SYSTOLIC BLOOD PRESSURE: 132 MMHG | WEIGHT: 249.12 LBS | TEMPERATURE: 98 F | OXYGEN SATURATION: 98 % | HEIGHT: 63 IN | BODY MASS INDEX: 44.14 KG/M2
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
## 2022-07-13 DIAGNOSIS — M25.561 ACUTE PAIN OF RIGHT KNEE: ICD-10-CM

## 2022-07-13 DIAGNOSIS — D64.9 ANEMIA: ICD-10-CM

## 2022-07-13 DIAGNOSIS — S80.219A KNEE ABRASION: ICD-10-CM

## 2022-07-13 DIAGNOSIS — S80.00XA KNEE CONTUSION: ICD-10-CM

## 2022-07-13 DIAGNOSIS — S60.222A CONTUSION OF LEFT HAND, INITIAL ENCOUNTER: ICD-10-CM

## 2022-07-13 DIAGNOSIS — S10.91XA ABRASION OF NECK, INITIAL ENCOUNTER: ICD-10-CM

## 2022-07-13 LAB
ALBUMIN SERPL BCP-MCNC: 3.2 G/DL (ref 3.5–5)
ALP SERPL-CCNC: 71 U/L (ref 46–116)
ALT SERPL W P-5'-P-CCNC: 24 U/L (ref 12–78)
ANION GAP SERPL CALCULATED.3IONS-SCNC: 8 MMOL/L (ref 4–13)
AST SERPL W P-5'-P-CCNC: 70 U/L (ref 5–45)
ATRIAL RATE: 79 BPM
BASOPHILS # BLD AUTO: 0.04 THOUSANDS/ΜL (ref 0–0.1)
BASOPHILS NFR BLD AUTO: 1 % (ref 0–1)
BILIRUB SERPL-MCNC: 0.36 MG/DL (ref 0.2–1)
BILIRUB UR QL STRIP: NEGATIVE
BUN SERPL-MCNC: 9 MG/DL (ref 5–25)
CALCIUM ALBUM COR SERPL-MCNC: 9.4 MG/DL (ref 8.3–10.1)
CALCIUM SERPL-MCNC: 8.8 MG/DL (ref 8.3–10.1)
CARDIAC TROPONIN I PNL SERPL HS: 3 NG/L
CHLORIDE SERPL-SCNC: 105 MMOL/L (ref 100–108)
CLARITY UR: CLEAR
CO2 SERPL-SCNC: 23 MMOL/L (ref 21–32)
COLOR UR: YELLOW
CREAT SERPL-MCNC: 0.71 MG/DL (ref 0.6–1.3)
EOSINOPHIL # BLD AUTO: 0 THOUSAND/ΜL (ref 0–0.61)
EOSINOPHIL NFR BLD AUTO: 0 % (ref 0–6)
ERYTHROCYTE [DISTWIDTH] IN BLOOD BY AUTOMATED COUNT: 21.2 % (ref 11.6–15.1)
EXT PREG TEST URINE: NEGATIVE
EXT. CONTROL ED NAV: NORMAL
GFR SERPL CREATININE-BSD FRML MDRD: 107 ML/MIN/1.73SQ M
GLUCOSE SERPL-MCNC: 96 MG/DL (ref 65–140)
GLUCOSE UR STRIP-MCNC: NEGATIVE MG/DL
HCT VFR BLD AUTO: 31.8 % (ref 34.8–46.1)
HGB BLD-MCNC: 8.8 G/DL (ref 11.5–15.4)
HGB UR QL STRIP.AUTO: NEGATIVE
IMM GRANULOCYTES # BLD AUTO: 0.03 THOUSAND/UL (ref 0–0.2)
IMM GRANULOCYTES NFR BLD AUTO: 0 % (ref 0–2)
KETONES UR STRIP-MCNC: NEGATIVE MG/DL
LEUKOCYTE ESTERASE UR QL STRIP: NEGATIVE
LYMPHOCYTES # BLD AUTO: 1.39 THOUSANDS/ΜL (ref 0.6–4.47)
LYMPHOCYTES NFR BLD AUTO: 17 % (ref 14–44)
MCH RBC QN AUTO: 17.8 PG (ref 26.8–34.3)
MCHC RBC AUTO-ENTMCNC: 27.7 G/DL (ref 31.4–37.4)
MCV RBC AUTO: 65 FL (ref 82–98)
MONOCYTES # BLD AUTO: 0.41 THOUSAND/ΜL (ref 0.17–1.22)
MONOCYTES NFR BLD AUTO: 5 % (ref 4–12)
NEUTROPHILS # BLD AUTO: 6.31 THOUSANDS/ΜL (ref 1.85–7.62)
NEUTS SEG NFR BLD AUTO: 77 % (ref 43–75)
NITRITE UR QL STRIP: NEGATIVE
NRBC BLD AUTO-RTO: 0 /100 WBCS
P AXIS: 65 DEGREES
PH UR STRIP.AUTO: 5.5 [PH] (ref 4.5–8)
PLATELET # BLD AUTO: 265 THOUSANDS/UL (ref 149–390)
PMV BLD AUTO: 9.3 FL (ref 8.9–12.7)
POTASSIUM SERPL-SCNC: 4 MMOL/L (ref 3.5–5.3)
POTASSIUM SERPL-SCNC: 6.5 MMOL/L (ref 3.5–5.3)
PR INTERVAL: 142 MS
PROT SERPL-MCNC: 8 G/DL (ref 6.4–8.2)
PROT UR STRIP-MCNC: NEGATIVE MG/DL
QRS AXIS: 59 DEGREES
QRSD INTERVAL: 90 MS
QT INTERVAL: 352 MS
QTC INTERVAL: 403 MS
RBC # BLD AUTO: 4.93 MILLION/UL (ref 3.81–5.12)
SODIUM SERPL-SCNC: 136 MMOL/L (ref 136–145)
SP GR UR STRIP.AUTO: 1.01 (ref 1–1.03)
T WAVE AXIS: 42 DEGREES
UROBILINOGEN UR QL STRIP.AUTO: 0.2 E.U./DL
VENTRICULAR RATE: 79 BPM
WBC # BLD AUTO: 8.18 THOUSAND/UL (ref 4.31–10.16)

## 2022-07-13 PROCEDURE — 81003 URINALYSIS AUTO W/O SCOPE: CPT

## 2022-07-13 PROCEDURE — 73564 X-RAY EXAM KNEE 4 OR MORE: CPT

## 2022-07-13 PROCEDURE — 93005 ELECTROCARDIOGRAM TRACING: CPT

## 2022-07-13 PROCEDURE — 99285 EMERGENCY DEPT VISIT HI MDM: CPT

## 2022-07-13 PROCEDURE — 96375 TX/PRO/DX INJ NEW DRUG ADDON: CPT

## 2022-07-13 PROCEDURE — 81025 URINE PREGNANCY TEST: CPT | Performed by: EMERGENCY MEDICINE

## 2022-07-13 PROCEDURE — 84132 ASSAY OF SERUM POTASSIUM: CPT | Performed by: EMERGENCY MEDICINE

## 2022-07-13 PROCEDURE — 96374 THER/PROPH/DIAG INJ IV PUSH: CPT

## 2022-07-13 PROCEDURE — 84484 ASSAY OF TROPONIN QUANT: CPT | Performed by: EMERGENCY MEDICINE

## 2022-07-13 PROCEDURE — 90715 TDAP VACCINE 7 YRS/> IM: CPT | Performed by: EMERGENCY MEDICINE

## 2022-07-13 PROCEDURE — 74176 CT ABD & PELVIS W/O CONTRAST: CPT

## 2022-07-13 PROCEDURE — 99285 EMERGENCY DEPT VISIT HI MDM: CPT | Performed by: EMERGENCY MEDICINE

## 2022-07-13 PROCEDURE — 71250 CT THORAX DX C-: CPT

## 2022-07-13 PROCEDURE — 93010 ELECTROCARDIOGRAM REPORT: CPT

## 2022-07-13 PROCEDURE — 80053 COMPREHEN METABOLIC PANEL: CPT | Performed by: EMERGENCY MEDICINE

## 2022-07-13 PROCEDURE — 85025 COMPLETE CBC W/AUTO DIFF WBC: CPT | Performed by: EMERGENCY MEDICINE

## 2022-07-13 PROCEDURE — 90471 IMMUNIZATION ADMIN: CPT

## 2022-07-13 PROCEDURE — 36415 COLL VENOUS BLD VENIPUNCTURE: CPT | Performed by: EMERGENCY MEDICINE

## 2022-07-13 PROCEDURE — 72125 CT NECK SPINE W/O DYE: CPT

## 2022-07-13 PROCEDURE — 73130 X-RAY EXAM OF HAND: CPT

## 2022-07-13 PROCEDURE — G1004 CDSM NDSC: HCPCS

## 2022-07-13 PROCEDURE — 70450 CT HEAD/BRAIN W/O DYE: CPT

## 2022-07-13 RX ORDER — ONDANSETRON 2 MG/ML
4 INJECTION INTRAMUSCULAR; INTRAVENOUS ONCE
Status: COMPLETED | OUTPATIENT
Start: 2022-07-13 | End: 2022-07-13

## 2022-07-13 RX ORDER — BACITRACIN, NEOMYCIN, POLYMYXIN B 400; 3.5; 5 [USP'U]/G; MG/G; [USP'U]/G
1 OINTMENT TOPICAL ONCE
Status: COMPLETED | OUTPATIENT
Start: 2022-07-13 | End: 2022-07-13

## 2022-07-13 RX ORDER — OXYCODONE HYDROCHLORIDE AND ACETAMINOPHEN 5; 325 MG/1; MG/1
1 TABLET ORAL EVERY 6 HOURS PRN
Qty: 8 TABLET | Refills: 0 | Status: SHIPPED | OUTPATIENT
Start: 2022-07-13 | End: 2022-07-23

## 2022-07-13 RX ORDER — IBUPROFEN 600 MG/1
600 TABLET ORAL EVERY 8 HOURS PRN
Qty: 12 TABLET | Refills: 0 | Status: SHIPPED | OUTPATIENT
Start: 2022-07-13

## 2022-07-13 RX ORDER — FENTANYL CITRATE 50 UG/ML
25 INJECTION, SOLUTION INTRAMUSCULAR; INTRAVENOUS ONCE
Status: COMPLETED | OUTPATIENT
Start: 2022-07-13 | End: 2022-07-13

## 2022-07-13 RX ORDER — MORPHINE SULFATE 4 MG/ML
6 INJECTION, SOLUTION INTRAMUSCULAR; INTRAVENOUS ONCE AS NEEDED
Status: DISCONTINUED | OUTPATIENT
Start: 2022-07-13 | End: 2022-07-13 | Stop reason: HOSPADM

## 2022-07-13 RX ADMIN — TETANUS TOXOID, REDUCED DIPHTHERIA TOXOID AND ACELLULAR PERTUSSIS VACCINE, ADSORBED 0.5 ML: 5; 2.5; 8; 8; 2.5 SUSPENSION INTRAMUSCULAR at 10:49

## 2022-07-13 RX ADMIN — ONDANSETRON 4 MG: 2 INJECTION INTRAMUSCULAR; INTRAVENOUS at 10:50

## 2022-07-13 RX ADMIN — FENTANYL CITRATE 25 MCG: 50 INJECTION, SOLUTION INTRAMUSCULAR; INTRAVENOUS at 10:50

## 2022-07-13 RX ADMIN — POLYMYXIN B SULFATE, BACITRACIN ZINC, NEOMYCIN SULFATE 1 LARGE APPLICATION: 5000; 3.5; 4 OINTMENT TOPICAL at 13:34
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
## 2022-07-13 NOTE — ED NOTES
Pt unable to tolerate weight bearing at this time  Pt able to ambulate with crutch use at this time        Armen Lanier  07/13/22 2271

## 2022-07-15 ENCOUNTER — APPOINTMENT (EMERGENCY)
Dept: RADIOLOGY | Facility: HOSPITAL | Age: 37
End: 2022-07-15

## 2022-07-15 ENCOUNTER — HOSPITAL ENCOUNTER (EMERGENCY)
Facility: HOSPITAL | Age: 37
Discharge: HOME | End: 2022-07-15
Attending: EMERGENCY MEDICINE
Payer: COMMERCIAL

## 2022-07-15 VITALS
OXYGEN SATURATION: 100 % | HEART RATE: 98 BPM | DIASTOLIC BLOOD PRESSURE: 81 MMHG | SYSTOLIC BLOOD PRESSURE: 147 MMHG | TEMPERATURE: 99.1 F | RESPIRATION RATE: 17 BRPM

## 2022-07-15 DIAGNOSIS — M54.9 BACK PAIN: ICD-10-CM

## 2022-07-15 DIAGNOSIS — S79.921A: ICD-10-CM

## 2022-07-15 DIAGNOSIS — M25.561 BILATERAL KNEE PAIN: ICD-10-CM

## 2022-07-15 DIAGNOSIS — M25.572 LEFT ANKLE PAIN: ICD-10-CM
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
## 2022-07-15 DIAGNOSIS — R51.9 HEADACHE: ICD-10-CM

## 2022-07-15 DIAGNOSIS — M25.562 BILATERAL KNEE PAIN: ICD-10-CM

## 2022-07-15 DIAGNOSIS — M54.2 NECK PAIN: ICD-10-CM

## 2022-07-15 LAB
HCT VFR BLD AUTO: 30.8 % (ref 34.8–46.1)
HGB BLD-MCNC: 8.6 G/DL (ref 11.5–15.4)

## 2022-07-15 PROCEDURE — 99284 EMERGENCY DEPT VISIT MOD MDM: CPT

## 2022-07-15 PROCEDURE — 96372 THER/PROPH/DIAG INJ SC/IM: CPT

## 2022-07-15 PROCEDURE — 99285 EMERGENCY DEPT VISIT HI MDM: CPT | Performed by: PHYSICIAN ASSISTANT

## 2022-07-15 PROCEDURE — 73610 X-RAY EXAM OF ANKLE: CPT

## 2022-07-15 PROCEDURE — 85018 HEMOGLOBIN: CPT | Performed by: PHYSICIAN ASSISTANT

## 2022-07-15 PROCEDURE — 36415 COLL VENOUS BLD VENIPUNCTURE: CPT | Performed by: PHYSICIAN ASSISTANT

## 2022-07-15 PROCEDURE — 85014 HEMATOCRIT: CPT | Performed by: PHYSICIAN ASSISTANT

## 2022-07-15 PROCEDURE — 73564 X-RAY EXAM KNEE 4 OR MORE: CPT

## 2022-07-15 PROCEDURE — 73552 X-RAY EXAM OF FEMUR 2/>: CPT

## 2022-07-15 RX ORDER — METHOCARBAMOL 750 MG/1
750 TABLET, FILM COATED ORAL EVERY 6 HOURS PRN
Qty: 20 TABLET | Refills: 0 | Status: SHIPPED | OUTPATIENT
Start: 2022-07-15

## 2022-07-15 RX ORDER — DIAZEPAM 5 MG/ML
5 INJECTION, SOLUTION INTRAMUSCULAR; INTRAVENOUS ONCE
Status: COMPLETED | OUTPATIENT
Start: 2022-07-15 | End: 2022-07-15

## 2022-07-15 RX ORDER — NAPROXEN 500 MG/1
500 TABLET ORAL 2 TIMES DAILY WITH MEALS
Qty: 30 TABLET | Refills: 0 | Status: SHIPPED | OUTPATIENT
Start: 2022-07-15

## 2022-07-15 RX ORDER — KETOROLAC TROMETHAMINE 30 MG/ML
15 INJECTION, SOLUTION INTRAMUSCULAR; INTRAVENOUS ONCE
Status: COMPLETED | OUTPATIENT
Start: 2022-07-15 | End: 2022-07-15

## 2022-07-15 RX ADMIN — DIAZEPAM 5 MG: 5 INJECTION INTRAMUSCULAR; INTRAVENOUS at 12:16

## 2022-07-15 RX ADMIN — KETOROLAC TROMETHAMINE 15 MG: 30 INJECTION, SOLUTION INTRAMUSCULAR; INTRAVENOUS at 12:14

## 2022-07-15 NOTE — DISCHARGE INSTRUCTIONS
DISCHARGE INSTRUCTIONS:    FOLLOW UP WITH YOUR PRIMARY CARE PROVIDER OR THE [address] [place] [address]  MAKE AN APPOINTMENT TO BE SEEN  TAKE MEDICATION AS PRESCRIBED  IF RASH, SHORTNESS OF BREATH OR TROUBLE SWALLOWING, STOP TAKING THE MEDICATION AND BE SEEN  REST  FOLLOW UP WITH CONCUSSION AND SPINE PROGRAM     IF SYMPTOMS WORSEN OR NEW SYMPTOMS ARISE, RETURN TO THE ER TO BE SEEN
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
## 2022-07-18 ENCOUNTER — TELEPHONE (OUTPATIENT)
Dept: PHYSICAL THERAPY | Facility: OTHER | Age: 37
End: 2022-07-18
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
## 2022-07-27 ENCOUNTER — TELEPHONE (OUTPATIENT)
Dept: FAMILY MEDICINE CLINIC | Facility: CLINIC | Age: 37
End: 2022-07-27

## 2022-10-18 ENCOUNTER — TELEPHONE (OUTPATIENT)
Dept: FAMILY MEDICINE CLINIC | Facility: CLINIC | Age: 37
End: 2022-10-18

## 2022-10-18 NOTE — TELEPHONE ENCOUNTER
Called patient to set up new patient appt  Pt stated she already has a family PCP with [facility]

## 2022-10-18 NOTE — TELEPHONE ENCOUNTER
----- Message from [employer] sent at 10/18/2022  8:04 AM EDT -----  Regarding: NP ED  PLEASE SCHEDULE NP ED F/U        - VERIFY INSURANCE        - UPDATE INFORMATION        - SEND LETTER AFTER 2ND ATTEMPT W/ NO CONTACT   ##PLEASE ROUTE MESSAGE BACK TO ME WHEN DONE##

## 2022-12-11 ENCOUNTER — APPOINTMENT (EMERGENCY)
Dept: RADIOLOGY | Facility: HOSPITAL | Age: 37
End: 2022-12-11

## 2022-12-11 ENCOUNTER — HOSPITAL ENCOUNTER (EMERGENCY)
Facility: HOSPITAL | Age: 37
Discharge: HOME | End: 2022-12-11
Attending: EMERGENCY MEDICINE

## 2022-12-11 VITALS
SYSTOLIC BLOOD PRESSURE: 128 MMHG | HEART RATE: 86 BPM | TEMPERATURE: 99.3 F | BODY MASS INDEX: 40.42 KG/M2 | OXYGEN SATURATION: 100 % | WEIGHT: 228.18 LBS | RESPIRATION RATE: 16 BRPM | DIASTOLIC BLOOD PRESSURE: 72 MMHG

## 2022-12-11 DIAGNOSIS — R07.9 CHEST PAIN: ICD-10-CM

## 2022-12-11 DIAGNOSIS — J10.1 INFLUENZA A: Primary | ICD-10-CM

## 2022-12-11 DIAGNOSIS — J06.9 VIRAL URI WITH COUGH: ICD-10-CM

## 2022-12-11 LAB
ANION GAP SERPL CALCULATED.3IONS-SCNC: 10 MMOL/L (ref 4–13)
BASOPHILS # BLD AUTO: 0.01 THOUSANDS/ÂΜL (ref 0–0.1)
BASOPHILS NFR BLD AUTO: 0 % (ref 0–1)
BUN SERPL-MCNC: 8 MG/DL (ref 5–25)
CALCIUM SERPL-MCNC: 8.9 MG/DL (ref 8.3–10.1)
CARDIAC TROPONIN I PNL SERPL HS: <2 NG/L
CARDIAC TROPONIN I PNL SERPL HS: <2 NG/L
CHLORIDE SERPL-SCNC: 104 MMOL/L (ref 96–108)
CO2 SERPL-SCNC: 23 MMOL/L (ref 21–32)
CREAT SERPL-MCNC: 0.89 MG/DL (ref 0.6–1.3)
EOSINOPHIL # BLD AUTO: 0 THOUSAND/ÂΜL (ref 0–0.61)
EOSINOPHIL NFR BLD AUTO: 0 % (ref 0–6)
ERYTHROCYTE [DISTWIDTH] IN BLOOD BY AUTOMATED COUNT: 22.1 % (ref 11.6–15.1)
FLUAV RNA RESP QL NAA+PROBE: POSITIVE
FLUBV RNA RESP QL NAA+PROBE: NEGATIVE
GFR SERPL CREATININE-BSD FRML MDRD: 81 ML/MIN/1.73SQ M
GLUCOSE SERPL-MCNC: 96 MG/DL (ref 65–140)
HCT VFR BLD AUTO: 39.1 % (ref 34.8–46.1)
HGB BLD-MCNC: 12.2 G/DL (ref 11.5–15.4)
IMM GRANULOCYTES # BLD AUTO: 0.01 THOUSAND/UL (ref 0–0.2)
IMM GRANULOCYTES NFR BLD AUTO: 0 % (ref 0–2)
LYMPHOCYTES # BLD AUTO: 0.67 THOUSANDS/ÂΜL (ref 0.6–4.47)
LYMPHOCYTES NFR BLD AUTO: 18 % (ref 14–44)
MCH RBC QN AUTO: 23.5 PG (ref 26.8–34.3)
MCHC RBC AUTO-ENTMCNC: 31.2 G/DL (ref 31.4–37.4)
MCV RBC AUTO: 75 FL (ref 82–98)
MONOCYTES # BLD AUTO: 0.4 THOUSAND/ÂΜL (ref 0.17–1.22)
MONOCYTES NFR BLD AUTO: 11 % (ref 4–12)
NEUTROPHILS # BLD AUTO: 2.66 THOUSANDS/ÂΜL (ref 1.85–7.62)
NEUTS SEG NFR BLD AUTO: 71 % (ref 43–75)
NRBC BLD AUTO-RTO: 0 /100 WBCS
PLATELET # BLD AUTO: 176 THOUSANDS/UL (ref 149–390)
POTASSIUM SERPL-SCNC: 4.5 MMOL/L (ref 3.5–5.3)
RBC # BLD AUTO: 5.19 MILLION/UL (ref 3.81–5.12)
RSV RNA RESP QL NAA+PROBE: NEGATIVE
SARS-COV-2 RNA RESP QL NAA+PROBE: NEGATIVE
SODIUM SERPL-SCNC: 137 MMOL/L (ref 135–147)
WBC # BLD AUTO: 3.75 THOUSAND/UL (ref 4.31–10.16)

## 2022-12-11 RX ORDER — KETOROLAC TROMETHAMINE 30 MG/ML
15 INJECTION, SOLUTION INTRAMUSCULAR; INTRAVENOUS ONCE
Status: COMPLETED | OUTPATIENT
Start: 2022-12-11 | End: 2022-12-11

## 2022-12-11 RX ORDER — SODIUM CHLORIDE 9 MG/ML
3 INJECTION INTRAVENOUS
Status: DISCONTINUED | OUTPATIENT
Start: 2022-12-11 | End: 2022-12-11 | Stop reason: HOSPADM

## 2022-12-11 RX ADMIN — KETOROLAC TROMETHAMINE 15 MG: 30 INJECTION, SOLUTION INTRAMUSCULAR; INTRAVENOUS at 16:31

## 2022-12-11 NOTE — DISCHARGE INSTRUCTIONS
Usted gil positivo para la gripe    Puede tener fiebre torsten 3-5 días con claude enfermedad viral y [place] cualquier síntoma adicional que se desarrolle (congestión, tos, náuseas, Meaghan Danny) puede durar otros 7 mimi  Asegúrese de tener un termómetro y si siente escalofríos o sudores, revíselo y anótelo  [place] Tylenol y Motrin para fiebres, simone corporales y simone de [place]  [place] con Motrin y Tylenol cada 3 horas  [place] muchos líquidos para mantenerse cristina hidratado al menos 2 L por día  [place] IRA con Meg Distance, té caliente, puede beber Gatorade/Powerade zero, mezclar con agua      Use aerosol nasal salino de venta brenda/medicamentos para la alergia para la congestión, la secreción nasal y el goteo posnasal  Mucinex (guaifenesina) ayuda a eliminar la mucosa  Delsym (dextrometorfano) es un supresor de la tos    [place]icks a la parte delantera / posterior de la parte inferior del pecho de los pies con calcetines

## 2022-12-11 NOTE — ED PROVIDER NOTES
History  Chief Complaint   Patient presents with   • Cough     Cough, headache for 4 days  17-year-old female with past medical history of asthma and anemia presents today with subjective fever, cough and headache x4 days  Patient has been taking Tylenol and NyQuil at home without any relief  Daughter is also being seen here with similar symptoms  As I was about to discharge the patient, patient asked "what are you going to do about my chest pain?"  Pt denied any chest pain prior to this statement  States that the chest pain started when she arrived here  Describes it as in the middle of her chest   Does not radiate anywhere  Does not get worse with coughing or movement or breathing- is constant  Reports that she feels short of breath however patient is in no visible distress  No vomiting or diarrhea  Language barrier: yes, Physician[employer]al Romanian interpretor   History obtained from: patient             Prior to Admission Medications   Prescriptions Last Dose Informant Patient Reported?  Taking?   ibuprofen (MOTRIN) 600 mg tablet   No No   Sig: Take 1 tablet (600 mg total) by mouth every 8 (eight) hours as needed for mild pain or moderate pain for up to 12 doses   methocarbamol (ROBAXIN) 750 mg tablet   No No   Sig: Take 1 tablet (750 mg total) by mouth every 6 (six) hours as needed for muscle spasms   naproxen (Naprosyn) 500 mg tablet   No No   Sig: Take 1 tablet (500 mg total) by mouth 2 (two) times a day with meals      Facility-Administered Medications: None       Past Medical History:   Diagnosis Date   • Anemia    • Asthma    • Kidney stone        Past Surgical History:   Procedure Laterality Date   •  SECTION      x 2   • FL RETROGRADE PYELOGRAM  2020   • FL RETROGRADE PYELOGRAM  2020   • [place] CYSTO/URETERO W/LITHOTRIPSY &INDWELL STENT INSRT Left 2020    Procedure: CYSTOSCOPY URETEROSCOPY WITH LITHOTRIPSY HOLMIUM LASER, RETROGRADE PYELOGRAM AND INSERTION STENT EXCHANGE URETERAL; Surgeon: Ruddy Hendrickson MD;  [facility];  Service: Urology   • URETERAL STENT PLACEMENT Left 8/30/2020    Procedure: INSERTION STENT URETERAL;  Surgeon: Orlando Lieberman MD;  [facility];  Service: Urology       History reviewed  No pertinent family history  I have reviewed and agree with the history as documented  E-Cigarette/Vaping   • E-Cigarette Use Never User      E-Cigarette/Vaping Substances   • Nicotine No    • THC No    • CBD No      Social History     Tobacco Use   • Smoking status: Never   • Smokeless tobacco: Never   Vaping Use   • Vaping Use: Never used   Substance Use Topics   • Alcohol use: No   • Drug use: No       Review of Systems   Constitutional: Positive for fever (subjective)  Respiratory: Positive for cough and shortness of breath  Cardiovascular: Positive for chest pain  Gastrointestinal: Negative for diarrhea and vomiting  Musculoskeletal: Positive for myalgias  All other systems reviewed and are negative  Physical Exam  Physical Exam  Vitals and nursing note reviewed  Constitutional:       General: She is not in acute distress  Appearance: Normal appearance  She is well-developed  She is obese  She is not ill-appearing  HENT:      Head: Normocephalic and atraumatic  Eyes:      Conjunctiva/sclera: Conjunctivae normal    Cardiovascular:      Rate and Rhythm: Normal rate and regular rhythm  Heart sounds: No murmur heard  Pulmonary:      Effort: Pulmonary effort is normal  No respiratory distress  Breath sounds: Normal breath sounds  Musculoskeletal:         General: No swelling  Normal range of motion  Cervical back: Normal range of motion and neck supple  Skin:     General: Skin is warm and dry  Capillary Refill: Capillary refill takes less than 2 seconds  Neurological:      Mental Status: She is alert     Psychiatric:         Mood and Affect: Mood normal          Vital Signs  ED Triage Vitals [12/11/22 8930] Temperature Pulse Respirations Blood Pressure SpO2   99 3 °F (37 4 °C) 97 16 135/71 100 %      Temp Source Heart Rate Source Patient Position - Orthostatic VS BP Location FiO2 (%)   Oral Monitor Sitting Right arm --      Pain Score       9           Vitals:    12/11/22 1514 12/11/22 1929   BP: 135/71 128/72   Pulse: 97 86   Patient Position - Orthostatic VS: Sitting Sitting         Visual Acuity      ED Medications  Medications   sodium chloride (PF) 0 9 % injection 3 mL (has no administration in time range)   ketorolac (TORADOL) injection 15 mg (15 mg Intravenous Given 12/11/22 1631)       Diagnostic Studies  Results Reviewed     Procedure Component Value Units Date/Time    HS Troponin I 2hr [[id number]] Collected: 12/11/22 1831    Lab Status: Final result Specimen: Blood from Arm, Right Updated: 12/11/22 1907     hs TnI 2hr <2 ng/L      Delta 2hr hsTnI --    HS Troponin 0hr (reflex protocol) [[id number]]  (Normal) Collected: 12/11/22 1648    Lab Status: Final result Specimen: Blood Updated: 12/11/22 1707     hs TnI 0hr <2 ng/L     Basic metabolic panel [[id number]] Collected: 12/11/22 1632    Lab Status: Final result Specimen: Blood from Arm, Right Updated: 12/11/22 1655     Sodium 137 mmol/L      Potassium 4 5 mmol/L      Chloride 104 mmol/L      CO2 23 mmol/L      ANION GAP 10 mmol/L      BUN 8 mg/dL      Creatinine 0 89 mg/dL      Glucose 96 mg/dL      Calcium 8 9 mg/dL      eGFR 81 ml/min/1 73sq m     Narrative:      [facility] guidelines for Chronic Kidney Disease (CKD):   •  Stage 1 with normal or high GFR (GFR > 90 mL/min/1 73 square meters)  •  Stage 2 Mild CKD (GFR = 60-89 mL/min/1 73 square meters)  •  Stage 3A Moderate CKD (GFR = 45-59 mL/min/1 73 square meters)  •  Stage 3B Moderate CKD (GFR = 30-44 mL/min/1 73 square meters)  •  Stage 4 Severe CKD (GFR = 15-29 mL/min/1 73 square meters)  •  Stage 5 End Stage CKD (GFR <15 mL/min/1 73 square meters)  Note: GFR calculation is accurate only with a steady state creatinine    CBC and differential [[id number]]  (Abnormal) Collected: 12/11/22 1632    Lab Status: Final result Specimen: Blood from Arm, Right Updated: 12/11/22 1645     WBC 3 75 Thousand/uL      RBC 5 19 Million/uL      Hemoglobin 12 2 g/dL      Hematocrit 39 1 %      MCV 75 fL      MCH 23 5 pg      MCHC 31 2 g/dL      RDW 22 1 %      Platelets 447 Thousands/uL      nRBC 0 /100 WBCs      Neutrophils Relative 71 %      Immat GRANS % 0 %      Lymphocytes Relative 18 %      Monocytes Relative 11 %      Eosinophils Relative 0 %      Basophils Relative 0 %      Neutrophils Absolute 2 66 Thousands/µL      Immature Grans Absolute 0 01 Thousand/uL      Lymphocytes Absolute 0 67 Thousands/µL      Monocytes Absolute 0 40 Thousand/µL      Eosinophils Absolute 0 00 Thousand/µL      Basophils Absolute 0 01 Thousands/µL     FLU/RSV/COVID - if FLU/RSV clinically relevant [[id number]]  (Abnormal) Collected: 12/11/22 1549    Lab Status: Final result Specimen: Nares from Nasopharyngeal Swab Updated: 12/11/22 1644     SARS-CoV-2 Negative     INFLUENZA A PCR Positive     INFLUENZA B PCR Negative     RSV PCR Negative    Narrative:      FOR PEDIATRIC PATIENTS - copy/paste COVID Guidelines URL to browser: [email or web address]    SARS-CoV-2 assay is a Nucleic Acid Amplification assay intended for the  qualitative detection of nucleic acid from SARS-CoV-2 in nasopharyngeal  swabs  Results are for the presumptive identification of SARS-CoV-2 RNA  Positive results are indicative of infection with SARS-CoV-2, the virus  causing COVID-19, but do not rule out bacterial infection or co-infection  with other viruses  Laboratories within the [place] and its  territories are required to report all positive results to the appropriate  public health authorities   Negative results do not preclude SARS-CoV-2  infection and should not be used as the sole basis for treatment or other  patient management decisions  Negative results must be combined with  clinical observations, patient history, and epidemiological information  This test has not been FDA cleared or approved  This test has been authorized by FDA under an Emergency Use Authorization  (EUA)  This test is only authorized for the duration of time the  declaration that circumstances exist justifying the authorization of the  emergency use of an in vitro diagnostic tests for detection of SARS-CoV-2  virus and/or diagnosis of COVID-19 infection under section 564(b)(1) of  the Act, 21 U  S C  405WQT-6(T)(9), unless the authorization is terminated  or revoked sooner  The test has been validated but independent review by FDA  and CLIA is pending  Test performed using [employer] GeneXpert: This RT-PCR assay targets N2,  a region unique to SARS-CoV-2  A conserved region in the E-gene was chosen  for pan-Sarbecovirus detection which includes SARS-CoV-2  According to CMS-2020-01-R, this platform meets the definition of high-throughput technology                   X-ray chest 1 view portable   Final Result by Dayron Parsons MD (12/11 0121)      No acute consolidation seen      Mild increased lung markings may be due to hypoinflation   No pleural effusion or pneumothorax      Workstation performed: [id number]                    Procedures  ECG 12 Lead Documentation Only    Date/Time: 12/11/2022 4:48 PM  Performed by: Chalino Cisse PA-C  Authorized by: Chalino Cisse PA-C     Indications / Diagnosis:  Chest pain  ECG reviewed by me, the ED Provider: yes    Patient location:  ED  Previous ECG:     Previous ECG:  Compared to current    Similarity:  No change  Interpretation:     Interpretation: normal    Rate:     ECG rate:  86    ECG rate assessment: normal    Rhythm:     Rhythm: sinus rhythm    Ectopy:     Ectopy: none    QRS:     QRS axis:  Normal  Conduction:     Conduction: normal    ST segments:     ST segments:  Normal  T waves:     T waves: normal    ECG 12 Lead Documentation Only    Date/Time: 12/11/2022 7:14 PM  Performed by: Zarina Caballero PA-C  Authorized by: Zarina Caballero PA-C     Indications / Diagnosis:  2 hr  ECG reviewed by me, the ED Provider: yes    Patient location:  ED  Previous ECG:     Previous ECG:  Compared to current    Similarity:  No change  Interpretation:     Interpretation: normal    Rate:     ECG rate:  82    ECG rate assessment: normal    Rhythm:     Rhythm: sinus rhythm    Ectopy:     Ectopy: none    QRS:     QRS axis:  Normal  Conduction:     Conduction: normal    ST segments:     ST segments:  Normal  T waves:     T waves: normal               ED Course  ED Course as of 12/11/22 2029   Sun Dec 11, 2022   1649 INFLU A PCR(!): Positive   1710 hs TnI 0hr: <2  Will need 2 hour as chest pain occurred <3 hours ago    44 Daughter told me pt wanted to be discharged however when I got interpretor and explained risks to pt she wished to stay and wait for 2 hr trop   1753 X-ray chest 1 view portable  No acute consolidation seen     Mild increased lung markings may be due to hypoinflation  No pleural effusion or pneumothorax   1909 hs TnI 2hr: <2           SBIRT 22yo+    Flowsheet Row Most Recent Value   SBIRT (23 yo +)    In order to provide better care to our patients, we are screening all of our patients for alcohol and drug use  Would it be okay to ask you these screening questions? No Filed at: 12/11/2022 1611                    MDM  Number of Diagnoses or Management Options  Chest pain  Influenza A: new and does not require workup  Viral URI with cough: new and does not require workup  Diagnosis management comments: 52-year-old female with past medical history of asthma and anemia presents today with subjective fever, cough and headache x4 days  Also noted chest pain that started on arrival to ED  Physical exam as noted above   Pt did end up testing positive for the flu- informed of results  Trop <2 however 2 hour had to be performed given length of symptoms- this was also <2  ECGs unremarkable  Unlikely cardiac in nature  Other blood work unremarkable  CXR was unremarkable as well  Supportive measures discussed with the pt in regards to flu test      I have discussed the plan to discharge pt from ED  The patient was discharged in stable condition   Patient ambulated off the department   Extensive return to emergency department precautions were discussed   Follow up with appropriate providers including primary care physician was discussed   Patient and/or their  primary decision maker expressed understanding  Sohail Ruano remained stable during entire emergency department stay  Portions of the record may have been created with voice recognition software  Occasional wrong word or "sound a like" substitutions may have occurred due to the inherent limitations of voice recognition software  Read the chart carefully and recognize, using context, where substitutions have occurred           Amount and/or Complexity of Data Reviewed  Clinical lab tests: ordered and reviewed  Tests in the radiology section of CPT®: ordered and reviewed  Decide to obtain previous medical records or to obtain history from someone other than the patient: yes  Review and summarize past medical records: yes    Patient Progress  Patient progress: stable      Disposition  Final diagnoses:   Viral URI with cough   Influenza A   Chest pain     Time reflects when diagnosis was documented in both MDM as applicable and the Disposition within this note     Time User Action Codes Description Comment    12/11/2022  3:48 PM Marquez Roulette Add [J06 9] Viral URI with cough     12/11/2022  5:04 PM Marquez Roulette Add [J10 1] Influenza A     12/11/2022  5:04 PM Marquez Roulette Modify [J06 9] Viral URI with cough     12/11/2022  5:04 PM Marquez Roulette Modify [J10 1] Influenza A     12/11/2022  8:28 PM Marquez Roulette Add [R07 9] Chest pain       ED Disposition     ED Disposition   Discharge    Condition   Stable    Date/Time   Sun Dec 11, 2022  4:49 PM    Comment   Fabiana Pendleton discharge to home/self care  Follow-up Information    None         Discharge Medication List as of 12/11/2022  7:12 PM      CONTINUE these medications which have NOT CHANGED    Details   ibuprofen (MOTRIN) 600 mg tablet Take 1 tablet (600 mg total) by mouth every 8 (eight) hours as needed for mild pain or moderate pain for up to 12 doses, Starting Wed 7/13/2022, Normal      methocarbamol (ROBAXIN) 750 mg tablet Take 1 tablet (750 mg total) by mouth every 6 (six) hours as needed for muscle spasms, Starting Fri 7/15/2022, Normal      naproxen (Naprosyn) 500 mg tablet Take 1 tablet (500 mg total) by mouth 2 (two) times a day with meals, Starting Fri 7/15/2022, Normal             No discharge procedures on file      PDMP Review       Value Time User    PDMP Reviewed  Yes 10/8/2020 11:00 AM King Destiny PA-C          ED Provider  Electronically Signed by           Kathia Ruth PA-C  12/11/22 2029

## 2022-12-11 NOTE — Clinical Note
Geovanna Zayas was seen and treated in our emergency department on 12/11/2022  Diagnosis:     Louise Wan  may return to work on return date  She may return on this date: 12/13/2022         If you have any questions or concerns, please don't hesitate to call        Mikayla Hernandez PA-C    ______________________________           _______________          _______________  Hospital Representative                              Date                                Time

## 2022-12-12 LAB
ATRIAL RATE: 82 BPM
ATRIAL RATE: 86 BPM
P AXIS: 54 DEGREES
P AXIS: 60 DEGREES
PR INTERVAL: 114 MS
PR INTERVAL: 130 MS
QRS AXIS: 61 DEGREES
QRS AXIS: 86 DEGREES
QRSD INTERVAL: 84 MS
QRSD INTERVAL: 86 MS
QT INTERVAL: 332 MS
QT INTERVAL: 354 MS
QTC INTERVAL: 397 MS
QTC INTERVAL: 413 MS
T WAVE AXIS: -6 DEGREES
T WAVE AXIS: 1 DEGREES
VENTRICULAR RATE: 82 BPM
VENTRICULAR RATE: 86 BPM

## 2023-03-29 NOTE — PROGRESS NOTES
[facility]    NAME: Makayla Flores  AGE: 34 y o  SEX: female  : 1985     DATE: 3/30/2023     Assessment and Plan:     Problem List Items Addressed This Visit        Genitourinary    Uterine fibroid       Other    Obesity, Class III, BMI 40-49 9 (morbid obesity) ([address] )     BMI Counseling: Body mass index is 40 39 kg/m²  The BMI is above normal  Nutrition recommendations include reducing portion sizes, decreasing overall calorie intake, 3-5 servings of fruits/vegetables daily, reducing fast food intake, consuming healthier snacks, decreasing soda and/or juice intake, moderation in carbohydrate intake, increasing intake of lean protein, reducing intake of saturated fat and trans fat and reducing intake of cholesterol  Exercise recommendations include moderate aerobic physical activity for 150 minutes/week  Iron deficiency anemia due to chronic blood loss     Lab Results   Component Value Date    WBC 3 75 (L) 2022    HGB 12 2 2022    HCT 39 1 2022    MCV 75 (L) 2022     2022     Managed by hematology, receives iron infusions  Continue management as per hematology           Healthcare maintenance     - She has a mammogram scheduled  Follows with [facility]: up to date with cervical CA screenings  Chronic midline back pain     - She uses voltaren gel PRN which helps  She did PT in the past several years ago in [facility]  However, at this point she just wants a breast reduction as she attributes macromastia to be the cause of her back pain  PT referral placed in case she needs it for breast reduction insurance approval         Relevant Medications    Diclofenac Sodium (VOLTAREN) 1 %    Other Relevant Orders    Ambulatory Referral to Physical Therapy    Macromastia     - She is a 40DDD bra size which causes intermittent midline back pain  Interested in breast reduction  Referral placed  Relevant Orders    Ambulatory Referral to Plastic Surgery    Ambulatory Referral to Physical Therapy   Other Visit Diagnoses     Annual physical exam    -  Primary          Immunizations and preventive care screenings were discussed with patient today  Appropriate education was printed on patient's after visit summary  Counseling:  Alcohol/drug use: discussed moderation in alcohol intake, the recommendations for healthy alcohol use, and avoidance of illicit drug use  Dental Health: discussed importance of regular tooth brushing, flossing, and dental visits  Injury prevention: discussed safety/seat belts, safety helmets, smoke detectors, carbon dioxide detectors, and smoking near bedding or upholstery  Sexual health: discussed sexually transmitted diseases, partner selection, use of condoms, avoidance of unintended pregnancy, and contraceptive alternatives  Exercise: the importance of regular exercise/physical activity was discussed  Recommend exercise 3-5 times per week for at least 30 minutes  Depression Screening and Follow-up Plan: Patient was screened for depression during today's encounter  They screened negative with a PHQ-2 score of 0  Return in about 6 months (around 9/30/2023) for Recheck back pain & macromastia  Chief Complaint:     Chief Complaint   Patient presents with   • New Patient Visit      History of Present Illness:     Adult Annual Physical   Patient here for a comprehensive physical exam and to establish care  Diet and Physical Activity  · Diet/Nutrition: well balanced diet and frequent junk food  · Exercise: moderate cardiovascular exercise, strength training exercises and 3-4 times a week on average        Depression Screening  PHQ-2/9 Depression Screening    Little interest or pleasure in doing things: 0 - not at all  Feeling down, depressed, or hopeless: 0 - not at all  PHQ-2 Score: 0  PHQ-2 Interpretation: Negative depression screen General Health  · Hearing: normal - bilateral   · Vision: vision problems: blurry far sighted vision and most recent eye exam >1 year ago  · Dental: no dental visits for >1 year, brushes teeth three times daily and flosses teeth occasionally  /GYN Health  · Patient has not had her menstrual cycle for 6 months because she has been receiving Lupron injections for her uterine fibroids  Prior to this, menstrual cycle was regular but heavy  Review of Systems:     Review of Systems   Constitutional: Negative for chills and fever  HENT: Negative for ear pain and sore throat  Eyes: Negative for pain and visual disturbance  Respiratory: Negative for cough and shortness of breath  Cardiovascular: Negative for chest pain and palpitations  Gastrointestinal: Negative for abdominal pain and vomiting  Genitourinary: Negative for dysuria and hematuria  Musculoskeletal: Positive for back pain  Negative for arthralgias  Skin: Negative for color change and rash  Neurological: Negative for seizures and syncope  All other systems reviewed and are negative       Past Medical History:     Past Medical History:   Diagnosis Date   • Anemia    • Anemia    • Asthma    • Asthma    • Kidney stone       Past Surgical History:     Past Surgical History:   Procedure Laterality Date   •  SECTION      x 2   • FL RETROGRADE PYELOGRAM  2020   • [place] RETROGRADE PYELOGRAM  2020   • [place] CYSTO/URETERO W/LITHOTRIPSY &INDWELL STENT INSRT Left 2020    Procedure: CYSTOSCOPY URETEROSCOPY WITH LITHOTRIPSY HOLMIUM LASER, RETROGRADE PYELOGRAM AND INSERTION STENT EXCHANGE URETERAL;  Surgeon: Sophia Rowley MD;  [facility];  Service: Urology   • URETERAL STENT PLACEMENT Left 2020    Procedure: INSERTION STENT URETERAL;  Surgeon: Prashanth Rehman MD;  [facility];  Service: Urology      Social History:     Social History     Socioeconomic History   • Marital status: Single     Spouse "name: None   • Number of children: None   • Years of education: None   • Highest education level: None   Occupational History   • None   Tobacco Use   • Smoking status: Never   • Smokeless tobacco: Never   Vaping Use   • Vaping Use: Never used   Substance and Sexual Activity   • Alcohol use: No   • Drug use: No   • Sexual activity: None   Other Topics Concern   • None   Social History Narrative   • None     Social Determinants of Health     Financial Resource Strain: Not on file   Food Insecurity: Not on file   Transportation Needs: Not on file   Physical Activity: Not on file   Stress: Not on file   Social Connections: Not on file   Intimate Partner Violence: Not on file   Housing Stability: Not on file      Family History:     Family History   Problem Relation Age of Onset   • Hypertension Father       Current Medications:     Current Outpatient Medications   Medication Sig Dispense Refill   • Diclofenac Sodium (VOLTAREN) 1 % Apply 2 g topically 4 (four) times a day 350 g 0   • buPROPion (WELLBUTRIN XL) 150 mg 24 hr tablet TOME CRISTIAN TABLETA TODOS LOS D AS     • ergocalciferol (VITAMIN D2) 50,000 units 1 EVERY WEEK     • Lupron [place], 3-Month, 11 25 MG injection      • phentermine 37 5 MG capsule Take 37 5 mg by mouth     • topiramate (TOPAMAX) 25 mg tablet 1 TABLETA DOS VECES AL D A       No current facility-administered medications for this visit  Allergies:     No Known Allergies   Physical Exam:     /80 (BP Location: Left arm, Patient Position: Sitting, Cuff Size: Standard)   Pulse 98   Temp (!) 97 4 °F (36 3 °C) (Temporal)   Resp 19   Ht 5' 3\" (1 6 m)   Wt 103 kg (228 lb)   SpO2 99%   BMI 40 39 kg/m²     Physical Exam  Vitals and nursing note reviewed  Constitutional:       General: She is not in acute distress  Appearance: Normal appearance  She is well-developed  She is obese  HENT:      Head: Normocephalic and atraumatic        Right Ear: External ear normal       Left Ear: External " ear normal       Nose: Nose normal       Mouth/Throat:      Mouth: Mucous membranes are moist    Eyes:      Conjunctiva/sclera: Conjunctivae normal       Pupils: Pupils are equal, round, and reactive to light  Cardiovascular:      Rate and Rhythm: Normal rate and regular rhythm  Pulses: Normal pulses  Heart sounds: Normal heart sounds  No murmur heard  Pulmonary:      Effort: Pulmonary effort is normal  No respiratory distress  Breath sounds: Normal breath sounds  Abdominal:      Palpations: Abdomen is soft  Tenderness: There is no abdominal tenderness  Musculoskeletal:         General: No swelling  Normal range of motion  Cervical back: Normal range of motion and neck supple  Skin:     General: Skin is warm and dry  Capillary Refill: Capillary refill takes less than 2 seconds  Neurological:      Mental Status: She is alert and oriented to person, place, and time  Mental status is at baseline  Psychiatric:         Mood and Affect: Mood normal          Behavior: Behavior normal          Thought Content:  Thought content normal          Judgment: Judgment normal           Miguelina Reddy, [address] [place] [address]

## 2023-03-30 ENCOUNTER — OFFICE VISIT (OUTPATIENT)
Dept: FAMILY MEDICINE CLINIC | Facility: CLINIC | Age: 38
End: 2023-03-30

## 2023-03-30 VITALS
HEART RATE: 98 BPM | HEIGHT: 63 IN | WEIGHT: 228 LBS | SYSTOLIC BLOOD PRESSURE: 122 MMHG | OXYGEN SATURATION: 99 % | RESPIRATION RATE: 19 BRPM | BODY MASS INDEX: 40.4 KG/M2 | DIASTOLIC BLOOD PRESSURE: 80 MMHG | TEMPERATURE: 97.4 F

## 2023-03-30 DIAGNOSIS — D25.9 UTERINE LEIOMYOMA, UNSPECIFIED LOCATION: ICD-10-CM

## 2023-03-30 DIAGNOSIS — M54.9 CHRONIC MIDLINE BACK PAIN, UNSPECIFIED BACK LOCATION: ICD-10-CM

## 2023-03-30 DIAGNOSIS — N62 MACROMASTIA: ICD-10-CM

## 2023-03-30 DIAGNOSIS — Z00.00 HEALTHCARE MAINTENANCE: ICD-10-CM

## 2023-03-30 DIAGNOSIS — Z00.00 ANNUAL PHYSICAL EXAM: Primary | ICD-10-CM

## 2023-03-30 DIAGNOSIS — E66.01 OBESITY, CLASS III, BMI 40-49.9 (MORBID OBESITY) (HCC): ICD-10-CM

## 2023-03-30 DIAGNOSIS — G89.29 CHRONIC MIDLINE BACK PAIN, UNSPECIFIED BACK LOCATION: ICD-10-CM

## 2023-03-30 DIAGNOSIS — D50.0 IRON DEFICIENCY ANEMIA DUE TO CHRONIC BLOOD LOSS: ICD-10-CM

## 2023-03-30 PROBLEM — D50.9 MICROCYTIC ANEMIA: Status: RESOLVED | Noted: 2020-08-30 | Resolved: 2023-03-30

## 2023-03-30 PROBLEM — N13.2 HYDRONEPHROSIS WITH URINARY OBSTRUCTION DUE TO URETERAL CALCULUS: Status: RESOLVED | Noted: 2020-08-30 | Resolved: 2023-03-30

## 2023-03-30 PROBLEM — N12 PYELONEPHRITIS: Status: RESOLVED | Noted: 2020-08-30 | Resolved: 2023-03-30

## 2023-03-30 PROBLEM — E87.1 HYPONATREMIA: Status: RESOLVED | Noted: 2020-10-06 | Resolved: 2023-03-30

## 2023-03-30 PROBLEM — R51.9 HEADACHE: Status: RESOLVED | Noted: 2020-10-06 | Resolved: 2023-03-30

## 2023-03-30 PROBLEM — A41.9 SEPSIS SECONDARY TO UTI (HCC): Status: RESOLVED | Noted: 2020-10-06 | Resolved: 2023-03-30

## 2023-03-30 PROBLEM — N39.0 SEPSIS SECONDARY TO UTI (HCC): Status: RESOLVED | Noted: 2020-10-06 | Resolved: 2023-03-30

## 2023-03-30 PROBLEM — N17.9 AKI (ACUTE KIDNEY INJURY) (HCC): Status: RESOLVED | Noted: 2020-10-06 | Resolved: 2023-03-30

## 2023-03-30 PROBLEM — E66.9 OBESITY: Status: RESOLVED | Noted: 2020-10-06 | Resolved: 2023-03-30

## 2023-03-30 RX ORDER — PHENTERMINE HYDROCHLORIDE 37.5 MG/1
37.5 CAPSULE ORAL
COMMUNITY

## 2023-03-30 RX ORDER — TOPIRAMATE 25 MG/1
TABLET ORAL
COMMUNITY
Start: 2023-03-02

## 2023-03-30 RX ORDER — LEUPROLIDE ACETATE 11.25 MG
KIT INTRAMUSCULAR
COMMUNITY
Start: 2023-01-10

## 2023-03-30 RX ORDER — ERGOCALCIFEROL 1.25 MG/1
CAPSULE ORAL
COMMUNITY
Start: 2023-03-02

## 2023-03-30 RX ORDER — BUPROPION HYDROCHLORIDE 150 MG/1
TABLET ORAL
COMMUNITY
Start: 2023-02-15

## 2023-03-30 NOTE — ASSESSMENT & PLAN NOTE
BMI Counseling: Body mass index is 40 39 kg/m²  The BMI is above normal  Nutrition recommendations include reducing portion sizes, decreasing overall calorie intake, 3-5 servings of fruits/vegetables daily, reducing fast food intake, consuming healthier snacks, decreasing soda and/or juice intake, moderation in carbohydrate intake, increasing intake of lean protein, reducing intake of saturated fat and trans fat and reducing intake of cholesterol  Exercise recommendations include moderate aerobic physical activity for 150 minutes/week

## 2023-03-30 NOTE — ASSESSMENT & PLAN NOTE
Lab Results   Component Value Date    WBC 3 75 (L) 12/11/2022    HGB 12 2 12/11/2022    HCT 39 1 12/11/2022    MCV 75 (L) 12/11/2022     12/11/2022     Managed by hematology, receives iron infusions   Continue management as per hematology

## 2023-03-30 NOTE — ASSESSMENT & PLAN NOTE
- She is a 40DDD bra size which causes intermittent midline back pain  Interested in breast reduction  Referral placed

## 2023-03-30 NOTE — ASSESSMENT & PLAN NOTE
- She uses voltaren gel PRN which helps  She did PT in the past several years ago in [facility]  However, at this point she just wants a breast reduction as she attributes macromastia to be the cause of her back pain   PT referral placed in case she needs it for breast reduction insurance approval

## 2023-03-30 NOTE — PATIENT INSTRUCTIONS
Wellness Visit for Adults   AMBULATORY CARE:   A wellness visit  is when you see your healthcare provider to get screened for health problems  Your healthcare provider will also give you advice on how to stay healthy  Write down your questions so you remember to ask them  Ask your healthcare provider how often you should have a wellness visit  What happens at a wellness visit:  Your healthcare provider will ask about your health, and your family history of health problems  This includes high blood pressure, heart disease, and cancer  He or she will ask if you have symptoms that concern you, if you smoke, and about your mood  You may also be asked about your intake of medicines, supplements, food, and alcohol  Any of the following may be done:  • Your weight  will be checked  Your height may also be checked so your body mass index (BMI) can be calculated  Your BMI shows if you are at a healthy weight  • Your blood pressure  and heart rate will be checked  Your temperature may also be checked  • Blood and urine tests  may be done  Blood tests may be done to check your cholesterol levels  Abnormal cholesterol levels increase your risk for heart disease and stroke  You may also need a blood or urine test to check for diabetes if you are at increased risk  Urine tests may be done to look for signs of an infection or kidney disease  • A physical exam  includes checking your heartbeat and lungs with a stethoscope  Your healthcare provider may also check your skin to look for sun damage  • Screening tests  may be recommended  A screening test is done to check for diseases that may not cause symptoms  The screening tests you may need depend on your age, gender, family history, and lifestyle habits  For example, colorectal screening may be recommended if you are 48years old or older  Screening tests you need if you are a woman:   • A Pap smear  is used to screen for cervical cancer   Pap smears are usually done every 3 to 5 years depending on your age  You may need them more often if you have had abnormal Pap smear test results in the past  Ask your healthcare provider how often you should have a Pap smear  • A mammogram  is an x-ray of your breasts to screen for breast cancer  Experts recommend mammograms every 2 years starting at age 48 years  You may need a mammogram at age 52 years or younger if you have an increased risk for breast cancer  Talk to your healthcare provider about when you should start having mammograms and how often you need them  Vaccines you may need:   • Get an influenza vaccine  every year  The influenza vaccine protects you from the flu  Several types of viruses cause the flu  The viruses change over time, so new vaccines are made each year  • Get a tetanus-diphtheria (Td) booster vaccine  every 10 years  This vaccine protects you against tetanus and diphtheria  Tetanus is a severe infection that may cause painful muscle spasms and lockjaw  Diphtheria is a severe bacterial infection that causes a thick covering in the back of your mouth and throat  • Get a human papillomavirus (HPV) vaccine  if you are female and aged 23 to 32 or male 23 to 24 and never received it  This vaccine protects you from HPV infection  HPV is the most common infection spread by sexual contact  HPV may also cause vaginal, penile, and anal cancers  • Get a pneumococcal vaccine  if you are aged 72 years or older  The pneumococcal vaccine is an injection given to protect you from pneumococcal disease  Pneumococcal disease is an infection caused by pneumococcal bacteria  The infection may cause pneumonia, meningitis, or an ear infection  • Get a shingles vaccine  if you are 60 or older, even if you have had shingles before  The shingles vaccine is an injection to protect you from the varicella-zoster virus  This is the same virus that causes chickenpox   Shingles is a painful rash that develops in people who had chickenpox or have been exposed to the virus  How to eat healthy:  My Plate is a model for planning healthy meals  It shows the types and amounts of foods that should go on your plate  Fruits and vegetables make up about half of your plate, and grains and protein make up the other half  A serving of dairy is included on the side of your plate  The amount of calories and serving sizes you need depends on your age, gender, weight, and height  Examples of healthy foods are listed below:  • Eat a variety of vegetables  such as dark green, red, and orange vegetables  You can also include canned vegetables low in sodium (salt) and frozen vegetables without added butter or sauces  • Eat a variety of fresh fruits , canned fruit in 100% juice, frozen fruit, and dried fruit  • Include whole grains  At least half of the grains you eat should be whole grains  Examples include whole-wheat bread, wheat pasta, brown rice, and whole-grain cereals such as oatmeal     • Eat a variety of protein foods such as seafood (fish and shellfish), lean meat, and poultry without skin (turkey and chicken)  Examples of lean meats include pork leg, shoulder, or tenderloin, and beef round, sirloin, tenderloin, and extra lean ground beef  Other protein foods include eggs and egg substitutes, beans, peas, soy products, nuts, and seeds  • Choose low-fat dairy products such as skim or 1% milk or low-fat yogurt, cheese, and cottage cheese  • Limit unhealthy fats  such as butter, hard margarine, and shortening  Exercise:  Exercise at least 30 minutes per day on most days of the week  Some examples of exercise include walking, biking, dancing, and swimming  You can also fit in more physical activity by taking the stairs instead of the elevator or parking farther away from stores  Include muscle strengthening activities 2 days each week  Regular exercise provides many health benefits   It helps you manage your weight, and decreases your risk for type 2 diabetes, heart disease, stroke, and high blood pressure  Exercise can also help improve your mood  Ask your healthcare provider about the best exercise plan for you  General health and safety guidelines:   • Do not smoke  Nicotine and other chemicals in cigarettes and cigars can cause lung damage  Ask your healthcare provider for information if you currently smoke and need help to quit  E-cigarettes or smokeless tobacco still contain nicotine  Talk to your healthcare provider before you use these products  • Limit alcohol  A drink of alcohol is 12 ounces of beer, 5 ounces of wine, or 1½ ounces of liquor  • Lose weight, if needed  Being overweight increases your risk of certain health conditions  These include heart disease, high blood pressure, type 2 diabetes, and certain types of cancer  • Protect your skin  Do not sunbathe or use tanning beds  Use sunscreen with a SPF 15 or higher  Apply sunscreen at least 15 minutes before you go outside  Reapply sunscreen every 2 hours  Wear protective clothing, hats, and sunglasses when you are outside  • Drive safely  Always wear your seatbelt  Make sure everyone in your car wears a seatbelt  A seatbelt can save your life if you are in an accident  Do not use your cell phone when you are driving  This could distract you and cause an accident  Pull over if you need to make a call or send a text message  • Practice safe sex  Use latex condoms if are sexually active and have more than one partner  Your healthcare provider may recommend screening tests for sexually transmitted infections (STIs)  • Wear helmets, lifejackets, and protective gear  Always wear a helmet when you ride a bike or motorcycle, go skiing, or play sports that could cause a head injury  Wear protective equipment when you play sports  Wear a lifejacket when you are on a boat or doing water sports      © Copyright [employer] 2022 Information is for End User's use only and may not be sold, redistributed or otherwise used for commercial purposes  The above information is an  only  It is not intended as medical advice for individual conditions or treatments  Talk to your doctor, nurse or pharmacist before following any medical regimen to see if it is safe and effective for you  Wellness Visit for Adults   AMBULATORY CARE:   A wellness visit  is when you see your healthcare provider to get screened for health problems  Your healthcare provider will also give you advice on how to stay healthy  Write down your questions so you remember to ask them  Ask your healthcare provider how often you should have a wellness visit  What happens at a wellness visit:  Your healthcare provider will ask about your health, and your family history of health problems  This includes high blood pressure, heart disease, and cancer  He or she will ask if you have symptoms that concern you, if you smoke, and about your mood  You may also be asked about your intake of medicines, supplements, food, and alcohol  Any of the following may be done:  • Your weight  will be checked  Your height may also be checked so your body mass index (BMI) can be calculated  Your BMI shows if you are at a healthy weight  • Your blood pressure  and heart rate will be checked  Your temperature may also be checked  • Blood and urine tests  may be done  Blood tests may be done to check your cholesterol levels  Abnormal cholesterol levels increase your risk for heart disease and stroke  You may also need a blood or urine test to check for diabetes if you are at increased risk  Urine tests may be done to look for signs of an infection or kidney disease  • A physical exam  includes checking your heartbeat and lungs with a stethoscope  Your healthcare provider may also check your skin to look for sun damage  • Screening tests  may be recommended   A screening test is done to check for diseases that may not cause symptoms  The screening tests you may need depend on your age, gender, family history, and lifestyle habits  For example, colorectal screening may be recommended if you are 48years old or older  Screening tests you need if you are a woman:   • A Pap smear  is used to screen for cervical cancer  Pap smears are usually done every 3 to 5 years depending on your age  You may need them more often if you have had abnormal Pap smear test results in the past  Ask your healthcare provider how often you should have a Pap smear  • A mammogram  is an x-ray of your breasts to screen for breast cancer  Experts recommend mammograms every 2 years starting at age 48 years  You may need a mammogram at age 52 years or younger if you have an increased risk for breast cancer  Talk to your healthcare provider about when you should start having mammograms and how often you need them  Vaccines you may need:   • Get an influenza vaccine  every year  The influenza vaccine protects you from the flu  Several types of viruses cause the flu  The viruses change over time, so new vaccines are made each year  • Get a tetanus-diphtheria (Td) booster vaccine  every 10 years  This vaccine protects you against tetanus and diphtheria  Tetanus is a severe infection that may cause painful muscle spasms and lockjaw  Diphtheria is a severe bacterial infection that causes a thick covering in the back of your mouth and throat  • Get a human papillomavirus (HPV) vaccine  if you are female and aged 23 to 32 or male 23 to 24 and never received it  This vaccine protects you from HPV infection  HPV is the most common infection spread by sexual contact  HPV may also cause vaginal, penile, and anal cancers  • Get a pneumococcal vaccine  if you are aged 72 years or older  The pneumococcal vaccine is an injection given to protect you from pneumococcal disease   Pneumococcal disease is an infection caused by pneumococcal bacteria  The infection may cause pneumonia, meningitis, or an ear infection  • Get a shingles vaccine  if you are 60 or older, even if you have had shingles before  The shingles vaccine is an injection to protect you from the varicella-zoster virus  This is the same virus that causes chickenpox  Shingles is a painful rash that develops in people who had chickenpox or have been exposed to the virus  How to eat healthy:  My Plate is a model for planning healthy meals  It shows the types and amounts of foods that should go on your plate  Fruits and vegetables make up about half of your plate, and grains and protein make up the other half  A serving of dairy is included on the side of your plate  The amount of calories and serving sizes you need depends on your age, gender, weight, and height  Examples of healthy foods are listed below:  • Eat a variety of vegetables  such as dark green, red, and orange vegetables  You can also include canned vegetables low in sodium (salt) and frozen vegetables without added butter or sauces  • Eat a variety of fresh fruits , canned fruit in 100% juice, frozen fruit, and dried fruit  • Include whole grains  At least half of the grains you eat should be whole grains  Examples include whole-wheat bread, wheat pasta, brown rice, and whole-grain cereals such as oatmeal     • Eat a variety of protein foods such as seafood (fish and shellfish), lean meat, and poultry without skin (turkey and chicken)  Examples of lean meats include pork leg, shoulder, or tenderloin, and beef round, sirloin, tenderloin, and extra lean ground beef  Other protein foods include eggs and egg substitutes, beans, peas, soy products, nuts, and seeds  • Choose low-fat dairy products such as skim or 1% milk or low-fat yogurt, cheese, and cottage cheese  • Limit unhealthy fats  such as butter, hard margarine, and shortening         Exercise:  Exercise at least 30 minutes per day on most days of the week  Some examples of exercise include walking, biking, dancing, and swimming  You can also fit in more physical activity by taking the stairs instead of the elevator or parking farther away from stores  Include muscle strengthening activities 2 days each week  Regular exercise provides many health benefits  It helps you manage your weight, and decreases your risk for type 2 diabetes, heart disease, stroke, and high blood pressure  Exercise can also help improve your mood  Ask your healthcare provider about the best exercise plan for you  General health and safety guidelines:   • Do not smoke  Nicotine and other chemicals in cigarettes and cigars can cause lung damage  Ask your healthcare provider for information if you currently smoke and need help to quit  E-cigarettes or smokeless tobacco still contain nicotine  Talk to your healthcare provider before you use these products  • Limit alcohol  A drink of alcohol is 12 ounces of beer, 5 ounces of wine, or 1½ ounces of liquor  • Lose weight, if needed  Being overweight increases your risk of certain health conditions  These include heart disease, high blood pressure, type 2 diabetes, and certain types of cancer  • Protect your skin  Do not sunbathe or use tanning beds  Use sunscreen with a SPF 15 or higher  Apply sunscreen at least 15 minutes before you go outside  Reapply sunscreen every 2 hours  Wear protective clothing, hats, and sunglasses when you are outside  • Drive safely  Always wear your seatbelt  Make sure everyone in your car wears a seatbelt  A seatbelt can save your life if you are in an accident  Do not use your cell phone when you are driving  This could distract you and cause an accident  Pull over if you need to make a call or send a text message  • Practice safe sex  Use latex condoms if are sexually active and have more than one partner   Your healthcare provider may recommend screening tests for sexually transmitted infections (STIs)  • Wear helmets, lifejackets, and protective gear  Always wear a helmet when you ride a bike or motorcycle, go skiing, or play sports that could cause a head injury  Wear protective equipment when you play sports  Wear a lifejacket when you are on a boat or doing water sports  © Copyright [employer] 2022 Information is for End User's use only and may not be sold, redistributed or otherwise used for commercial purposes  The above information is an  only  It is not intended as medical advice for individual conditions or treatments  Talk to your doctor, nurse or pharmacist before following any medical regimen to see if it is safe and effective for you

## 2023-05-01 DIAGNOSIS — M54.9 CHRONIC MIDLINE BACK PAIN, UNSPECIFIED BACK LOCATION: ICD-10-CM

## 2023-05-01 DIAGNOSIS — G89.29 CHRONIC MIDLINE BACK PAIN, UNSPECIFIED BACK LOCATION: ICD-10-CM

## 2023-05-29 PROBLEM — Z00.00 HEALTHCARE MAINTENANCE: Status: RESOLVED | Noted: 2023-03-30 | Resolved: 2023-05-29

## 2024-03-10 ENCOUNTER — HOSPITAL ENCOUNTER (EMERGENCY)
Facility: HOSPITAL | Age: 39
Discharge: HOME | End: 2024-03-10
Attending: EMERGENCY MEDICINE

## 2024-03-10 ENCOUNTER — APPOINTMENT (EMERGENCY)
Dept: RADIOLOGY | Facility: HOSPITAL | Age: 39
End: 2024-03-10

## 2024-03-10 VITALS
TEMPERATURE: 98.4 F | SYSTOLIC BLOOD PRESSURE: 126 MMHG | HEART RATE: 94 BPM | DIASTOLIC BLOOD PRESSURE: 68 MMHG | RESPIRATION RATE: 18 BRPM | OXYGEN SATURATION: 98 %

## 2024-03-10 DIAGNOSIS — R07.9 CHEST PAIN: Primary | ICD-10-CM

## 2024-03-10 LAB
ALBUMIN SERPL BCP-MCNC: 3.7 G/DL (ref 3.5–5)
ALP SERPL-CCNC: 80 U/L (ref 34–104)
ALT SERPL W P-5'-P-CCNC: 19 U/L (ref 7–52)
ANION GAP SERPL CALCULATED.3IONS-SCNC: 4 MMOL/L
AST SERPL W P-5'-P-CCNC: 27 U/L (ref 13–39)
ATRIAL RATE: 102 BPM
BACTERIA UR QL AUTO: ABNORMAL /HPF
BASOPHILS # BLD AUTO: 0.03 THOUSANDS/ÂΜL (ref 0–0.1)
BASOPHILS NFR BLD AUTO: 1 % (ref 0–1)
BILIRUB SERPL-MCNC: 0.3 MG/DL (ref 0.2–1)
BILIRUB UR QL STRIP: NEGATIVE
BUN SERPL-MCNC: 11 MG/DL (ref 5–25)
CALCIUM SERPL-MCNC: 8.5 MG/DL (ref 8.4–10.2)
CARDIAC TROPONIN I PNL SERPL HS: <2 NG/L
CHLORIDE SERPL-SCNC: 104 MMOL/L (ref 96–108)
CLARITY UR: CLEAR
CO2 SERPL-SCNC: 24 MMOL/L (ref 21–32)
COLOR UR: YELLOW
CREAT SERPL-MCNC: 0.84 MG/DL (ref 0.6–1.3)
EOSINOPHIL # BLD AUTO: 0 THOUSAND/ÂΜL (ref 0–0.61)
EOSINOPHIL NFR BLD AUTO: 0 % (ref 0–6)
ERYTHROCYTE [DISTWIDTH] IN BLOOD BY AUTOMATED COUNT: 15.7 % (ref 11.6–15.1)
EXT PREGNANCY TEST URINE: NEGATIVE
EXT. CONTROL: NORMAL
FLUAV RNA RESP QL NAA+PROBE: NEGATIVE
FLUBV RNA RESP QL NAA+PROBE: NEGATIVE
GFR SERPL CREATININE-BSD FRML MDRD: 86 ML/MIN/1.73SQ M
GLUCOSE SERPL-MCNC: 100 MG/DL (ref 65–140)
GLUCOSE UR STRIP-MCNC: NEGATIVE MG/DL
HCT VFR BLD AUTO: 36.7 % (ref 34.8–46.1)
HGB BLD-MCNC: 11.8 G/DL (ref 11.5–15.4)
HGB UR QL STRIP.AUTO: NEGATIVE
IMM GRANULOCYTES # BLD AUTO: 0.02 THOUSAND/UL (ref 0–0.2)
IMM GRANULOCYTES NFR BLD AUTO: 1 % (ref 0–2)
KETONES UR STRIP-MCNC: NEGATIVE MG/DL
LEUKOCYTE ESTERASE UR QL STRIP: NEGATIVE
LYMPHOCYTES # BLD AUTO: 0.53 THOUSANDS/ÂΜL (ref 0.6–4.47)
LYMPHOCYTES NFR BLD AUTO: 13 % (ref 14–44)
MCH RBC QN AUTO: 25 PG (ref 26.8–34.3)
MCHC RBC AUTO-ENTMCNC: 32.2 G/DL (ref 31.4–37.4)
MCV RBC AUTO: 78 FL (ref 82–98)
MONOCYTES # BLD AUTO: 0.51 THOUSAND/ÂΜL (ref 0.17–1.22)
MONOCYTES NFR BLD AUTO: 13 % (ref 4–12)
MUCOUS THREADS UR QL AUTO: ABNORMAL
NEUTROPHILS # BLD AUTO: 2.86 THOUSANDS/ÂΜL (ref 1.85–7.62)
NEUTS SEG NFR BLD AUTO: 72 % (ref 43–75)
NITRITE UR QL STRIP: NEGATIVE
NON-SQ EPI CELLS URNS QL MICRO: ABNORMAL /HPF
NRBC BLD AUTO-RTO: 0 /100 WBCS
P AXIS: 52 DEGREES
PH UR STRIP.AUTO: 7 [PH] (ref 4.5–8)
PLATELET # BLD AUTO: 188 THOUSANDS/UL (ref 149–390)
PMV BLD AUTO: 10.2 FL (ref 8.9–12.7)
POTASSIUM SERPL-SCNC: 4.9 MMOL/L (ref 3.5–5.3)
PR INTERVAL: 134 MS
PROT SERPL-MCNC: 7.1 G/DL (ref 6.4–8.4)
PROT UR STRIP-MCNC: ABNORMAL MG/DL
QRS AXIS: 87 DEGREES
QRSD INTERVAL: 90 MS
QT INTERVAL: 324 MS
QTC INTERVAL: 422 MS
RBC # BLD AUTO: 4.72 MILLION/UL (ref 3.81–5.12)
RBC #/AREA URNS AUTO: ABNORMAL /HPF
RSV RNA RESP QL NAA+PROBE: NEGATIVE
SARS-COV-2 RNA RESP QL NAA+PROBE: NEGATIVE
SODIUM SERPL-SCNC: 132 MMOL/L (ref 135–147)
SP GR UR STRIP.AUTO: 1.02 (ref 1–1.03)
T WAVE AXIS: 15 DEGREES
UROBILINOGEN UR QL STRIP.AUTO: 0.2 E.U./DL
VENTRICULAR RATE: 102 BPM
WBC # BLD AUTO: 3.95 THOUSAND/UL (ref 4.31–10.16)
WBC #/AREA URNS AUTO: ABNORMAL /HPF

## 2024-03-10 PROCEDURE — 93005 ELECTROCARDIOGRAM TRACING: CPT

## 2024-03-10 PROCEDURE — 36415 COLL VENOUS BLD VENIPUNCTURE: CPT | Performed by: EMERGENCY MEDICINE

## 2024-03-10 PROCEDURE — 0241U HB NFCT DS VIR RESP RNA 4 TRGT: CPT | Performed by: EMERGENCY MEDICINE

## 2024-03-10 PROCEDURE — 71046 X-RAY EXAM CHEST 2 VIEWS: CPT

## 2024-03-10 PROCEDURE — 80053 COMPREHEN METABOLIC PANEL: CPT | Performed by: EMERGENCY MEDICINE

## 2024-03-10 PROCEDURE — 85025 COMPLETE CBC W/AUTO DIFF WBC: CPT | Performed by: EMERGENCY MEDICINE

## 2024-03-10 PROCEDURE — 93010 ELECTROCARDIOGRAM REPORT: CPT

## 2024-03-10 PROCEDURE — 84484 ASSAY OF TROPONIN QUANT: CPT | Performed by: EMERGENCY MEDICINE

## 2024-03-10 PROCEDURE — 81001 URINALYSIS AUTO W/SCOPE: CPT

## 2024-03-10 PROCEDURE — 99285 EMERGENCY DEPT VISIT HI MDM: CPT | Performed by: EMERGENCY MEDICINE

## 2024-03-10 PROCEDURE — 81025 URINE PREGNANCY TEST: CPT | Performed by: EMERGENCY MEDICINE

## 2024-03-10 PROCEDURE — 96374 THER/PROPH/DIAG INJ IV PUSH: CPT

## 2024-03-10 PROCEDURE — 99285 EMERGENCY DEPT VISIT HI MDM: CPT

## 2024-03-10 RX ORDER — KETOROLAC TROMETHAMINE 30 MG/ML
15 INJECTION, SOLUTION INTRAMUSCULAR; INTRAVENOUS ONCE
Status: COMPLETED | OUTPATIENT
Start: 2024-03-10 | End: 2024-03-10

## 2024-03-10 RX ADMIN — KETOROLAC TROMETHAMINE 15 MG: 30 INJECTION, SOLUTION INTRAMUSCULAR; INTRAVENOUS at 09:08

## 2024-03-10 NOTE — ED PROVIDER NOTES
History  Chief Complaint   Patient presents with    Chest Pain     C/o chest and back pain starting Saturday and ongoing today. Chest pain is generalized.        History provided by:  Patient   used: Yes ([employer] iPad # [id number])    Chest Pain  Pain location:  L chest and R chest  Pain radiates to:  Does not radiate  Pain radiates to the back: yes    Pain severity:  Moderate  Onset quality:  Gradual  Duration:  1 day  Timing:  Intermittent  Progression:  Waxing and waning  Chronicity:  New  Context: at rest    Relieved by:  Nothing  Worsened by:  Nothing tried  Ineffective treatments:  None tried  Associated symptoms: cough    Associated symptoms: no abdominal pain, no altered mental status, no back pain, no dizziness, no dysphagia, no fever, no headache, no nausea, no shortness of breath and not vomiting    Cough:     Cough characteristics:  Non-productive    Sputum characteristics:  Nondescript    Severity:  Mild    Onset quality:  Gradual    Duration:  1 day    Timing:  Intermittent    Progression:  Waxing and waning    Chronicity:  New  Risk factors comment:  Asthma      Prior to Admission Medications   Prescriptions Last Dose Informant Patient Reported? Taking?   Diclofenac Sodium (VOLTAREN) 1 %   No No   Sig: APLIQUE 2 GRAMS EL AREA AFECTADA CUATRO VECES AL RAMONE   Lupron Depot, 3-Month, 11.25 MG injection   Yes No   buPROPion (WELLBUTRIN XL) 150 mg 24 hr tablet   Yes No   Sig: TOME CRISTIAN TABLETA TODOS LOS D AS   ergocalciferol (VITAMIN D2) 50,000 units   Yes No   Si EVERY WEEK   phentermine 37.5 MG capsule   Yes No   Sig: Take 37.5 mg by mouth   topiramate (TOPAMAX) 25 mg tablet   Yes No   Si TABLETA DOS VECES AL D A      Facility-Administered Medications: None       Past Medical History:   Diagnosis Date    Anemia     Anemia     Asthma     Asthma     Kidney stone        Past Surgical History:   Procedure Laterality Date     SECTION      x 2    FL RETROGRADE PYELOGRAM  2020     FL RETROGRADE PYELOGRAM  9/29/2020    OR CYSTO/URETERO W/LITHOTRIPSY &INDWELL STENT INSRT Left 9/29/2020    Procedure: CYSTOSCOPY URETEROSCOPY WITH LITHOTRIPSY HOLMIUM LASER, RETROGRADE PYELOGRAM AND INSERTION STENT EXCHANGE URETERAL;  Surgeon: Calixto Langley MD;  Location: [facility];  Service: Urology    URETERAL STENT PLACEMENT Left 8/30/2020    Procedure: INSERTION STENT URETERAL;  Surgeon: Reno Resendiz MD;  Location: [facility];  Service: Urology       Family History   Problem Relation Age of Onset    Hypertension Father      I have reviewed and agree with the history as documented.    E-Cigarette/Vaping    E-Cigarette Use Never User      E-Cigarette/Vaping Substances    Nicotine No     THC No     CBD No      Social History     Tobacco Use    Smoking status: Never    Smokeless tobacco: Never   Vaping Use    Vaping status: Never Used   Substance Use Topics    Alcohol use: No    Drug use: No       Review of Systems   Constitutional:  Negative for chills and fever.   HENT:  Negative for facial swelling, sore throat and trouble swallowing.    Eyes:  Negative for pain and visual disturbance.   Respiratory:  Positive for cough. Negative for chest tightness and shortness of breath.    Cardiovascular:  Positive for chest pain. Negative for leg swelling.   Gastrointestinal:  Negative for abdominal pain, diarrhea, nausea and vomiting.   Genitourinary:  Negative for dysuria and flank pain.   Musculoskeletal:  Negative for back pain, neck pain and neck stiffness.   Skin:  Negative for pallor and rash.   Allergic/Immunologic: Negative for environmental allergies and immunocompromised state.   Neurological:  Negative for dizziness and headaches.   Hematological:  Negative for adenopathy. Does not bruise/bleed easily.   Psychiatric/Behavioral:  Negative for agitation and behavioral problems.    All other systems reviewed and are negative.      Physical Exam  Physical Exam  Vitals and nursing note reviewed.    Constitutional:       General: She is not in acute distress.     Appearance: She is well-developed.   HENT:      Head: Normocephalic and atraumatic.   Eyes:      Extraocular Movements: Extraocular movements intact.   Cardiovascular:      Rate and Rhythm: Normal rate and regular rhythm.      Heart sounds: Normal heart sounds.   Pulmonary:      Effort: Pulmonary effort is normal.      Breath sounds: Normal breath sounds.   Abdominal:      Palpations: Abdomen is soft.      Tenderness: There is no abdominal tenderness. There is no guarding or rebound.   Musculoskeletal:         General: Normal range of motion.      Cervical back: Normal range of motion and neck supple.   Skin:     General: Skin is warm and dry.   Neurological:      General: No focal deficit present.      Mental Status: She is alert and oriented to person, place, and time.   Psychiatric:         Mood and Affect: Mood normal.         Behavior: Behavior normal.         Vital Signs  ED Triage Vitals   Temperature Pulse Respirations Blood Pressure SpO2   03/10/24 0820 03/10/24 0820 03/10/24 0820 03/10/24 0820 03/10/24 0820   98.4 °F (36.9 °C) (!) 111 19 138/70 98 %      Temp Source Heart Rate Source Patient Position - Orthostatic VS BP Location FiO2 (%)   03/10/24 0820 03/10/24 0820 03/10/24 0820 03/10/24 0820 --   Oral Monitor Sitting Right arm       Pain Score       03/10/24 0908       9           Vitals:    03/10/24 0820 03/10/24 0830 03/10/24 0845 03/10/24 0846   BP: 138/70 138/70 112/63 107/52   Pulse: (!) 111 92     Patient Position - Orthostatic VS: Sitting Sitting Sitting Sitting         Visual Acuity      ED Medications  Medications   ketorolac (TORADOL) injection 15 mg (15 mg Intravenous Given 3/10/24 0908)       Diagnostic Studies  Results Reviewed       Procedure Component Value Units Date/Time    Urine Microscopic [[id number]]  (Abnormal) Collected: 03/10/24 0900    Lab Status: Final result Specimen: Urine, Clean Catch Updated: 03/10/24 0953      RBC, UA 1-2 /hpf      WBC, UA 1-2 /hpf      Epithelial Cells Moderate /hpf      Bacteria, UA Occasional /hpf      MUCUS THREADS Occasional    FLU/RSV/COVID - if FLU/RSV clinically relevant [[id number]]  (Normal) Collected: 03/10/24 0851    Lab Status: Final result Specimen: Nares from Nose Updated: 03/10/24 0938     SARS-CoV-2 Negative     INFLUENZA A PCR Negative     INFLUENZA B PCR Negative     RSV PCR Negative    Narrative:      FOR PEDIATRIC PATIENTS - copy/paste COVID Guidelines URL to browser: [email or web address]    SARS-CoV-2 assay is a Nucleic Acid Amplification assay intended for the  qualitative detection of nucleic acid from SARS-CoV-2 in nasopharyngeal  swabs. Results are for the presumptive identification of SARS-CoV-2 RNA.    Positive results are indicative of infection with SARS-CoV-2, the virus  causing COVID-19, but do not rule out bacterial infection or co-infection  with other viruses. Laboratories within the [place] and its  territories are required to report all positive results to the appropriate  public health authorities. Negative results do not preclude SARS-CoV-2  infection and should not be used as the sole basis for treatment or other  patient management decisions. Negative results must be combined with  clinical observations, patient history, and epidemiological information.  This test has not been FDA cleared or approved.    This test has been authorized by FDA under an Emergency Use Authorization  (EUA). This test is only authorized for the duration of time the  declaration that circumstances exist justifying the authorization of the  emergency use of an in vitro diagnostic tests for detection of SARS-CoV-2  virus and/or diagnosis of COVID-19 infection under section 564(b)(1) of  the Act, 21 U.S.C. 360bbb-3(b)(1), unless the authorization is terminated  or revoked sooner. The test has been validated but independent review by  FDA  and CLIA is pending.    Test performed using [employer] GeneXpert: This RT-PCR assay targets N2,  a region unique to SARS-CoV-2. A conserved region in the E-gene was chosen  for pan-Sarbecovirus detection which includes SARS-CoV-2.    According to CMS-2020-01-R, this platform meets the definition of high-throughput technology.    Comprehensive metabolic panel [[id number]]  (Abnormal) Collected: 03/10/24 0851    Lab Status: Final result Specimen: Blood from Arm, Right Updated: 03/10/24 0932     Sodium 132 mmol/L      Potassium 4.9 mmol/L      Chloride 104 mmol/L      CO2 24 mmol/L      ANION GAP 4 mmol/L      BUN 11 mg/dL      Creatinine 0.84 mg/dL      Glucose 100 mg/dL      Calcium 8.5 mg/dL      AST 27 U/L      ALT 19 U/L      Alkaline Phosphatase 80 U/L      Total Protein 7.1 g/dL      Albumin 3.7 g/dL      Total Bilirubin 0.30 mg/dL      eGFR 86 ml/min/1.73sq m     Narrative:      National Kidney Disease Foundation guidelines for Chronic Kidney Disease (CKD):     Stage 1 with normal or high GFR (GFR > 90 mL/min/1.73 square meters)    Stage 2 Mild CKD (GFR = 60-89 mL/min/1.73 square meters)    Stage 3A Moderate CKD (GFR = 45-59 mL/min/1.73 square meters)    Stage 3B Moderate CKD (GFR = 30-44 mL/min/1.73 square meters)    Stage 4 Severe CKD (GFR = 15-29 mL/min/1.73 square meters)    Stage 5 End Stage CKD (GFR <15 mL/min/1.73 square meters)  Note: GFR calculation is accurate only with a steady state creatinine    HS Troponin 0hr (reflex protocol) [[id number]]  (Normal) Collected: 03/10/24 0851    Lab Status: Final result Specimen: Blood from Arm, Right Updated: 03/10/24 0920     hs TnI 0hr <2 ng/L     POCT pregnancy, urine [[id number]]  (Normal) Resulted: 03/10/24 0904    Lab Status: Final result Updated: 03/10/24 0904     EXT Preg Test, Ur Negative     Control Valid    Urine Macroscopic, POC [[id number]]  (Abnormal) Collected: 03/10/24 0900    Lab Status: Final result Specimen: Urine Updated: 03/10/24 0901      Color, UA Yellow     Clarity, UA Clear     pH, UA 7.0     Leukocytes, UA Negative     Nitrite, UA Negative     Protein, UA Trace mg/dl      Glucose, UA Negative mg/dl      Ketones, UA Negative mg/dl      Urobilinogen, UA 0.2 E.U./dl      Bilirubin, UA Negative     Occult Blood, UA Negative     Specific Gravity, UA 1.020    Narrative:      CLINITEK RESULT    CBC and differential [[id number]]  (Abnormal) Collected: 03/10/24 0851    Lab Status: Final result Specimen: Blood from Arm, Right Updated: 03/10/24 0858     WBC 3.95 Thousand/uL      RBC 4.72 Million/uL      Hemoglobin 11.8 g/dL      Hematocrit 36.7 %      MCV 78 fL      MCH 25.0 pg      MCHC 32.2 g/dL      RDW 15.7 %      MPV 10.2 fL      Platelets 188 Thousands/uL      nRBC 0 /100 WBCs      Neutrophils Relative 72 %      Immat GRANS % 1 %      Lymphocytes Relative 13 %      Monocytes Relative 13 %      Eosinophils Relative 0 %      Basophils Relative 1 %      Neutrophils Absolute 2.86 Thousands/µL      Immature Grans Absolute 0.02 Thousand/uL      Lymphocytes Absolute 0.53 Thousands/µL      Monocytes Absolute 0.51 Thousand/µL      Eosinophils Absolute 0.00 Thousand/µL      Basophils Absolute 0.03 Thousands/µL                    XR chest 2 views    (Results Pending)              Procedures  ECG 12 Lead Documentation Only    Date/Time: 3/10/2024 8:30 AM    Performed by: Gennaro Uribe MD  Authorized by: Gennaro Uribe MD    Indications / Diagnosis:  Chest pain  ECG reviewed by me, the ED Provider: yes    Patient location:  ED  Interpretation:     Interpretation: normal    Rate:     ECG rate assessment: normal    Rhythm:     Rhythm: sinus rhythm    Ectopy:     Ectopy: none    QRS:     QRS axis:  Normal  Conduction:     Conduction: normal    ST segments:     ST segments:  Normal  T waves:     T waves: normal             ED Course  ED Course as of 03/10/24 1052   Sun Mar 10, 2024   0914 WBC(!): 3.95   0914 Hemoglobin: 11.8   0914 Platelet Count: 188  CBC reviewed,  non-acute.   0915 XR chest 2 views  Chest x-ray independently reviewed, no significant acute abnormality noted.   1031 WBC(!): 3.95   1031 Hemoglobin: 11.8   1031 Platelet Count: 188   1031 Sodium(!): 132   1031 Potassium: 4.9   1031 BUN: 11   1031 Creatinine: 0.84   1031 GLUCOSE: 100   1031 hs TnI 0hr: <2  Labs reviewed, no significant acute abnormality noted.   1050 Patient reevaluated, sleeping comfortably, informed about nonacute workup results, will for discharge, advised follow-up with PCP             HEART Risk Score      Flowsheet Row Most Recent Value   Heart Score Risk Calculator    History 1 Filed at: 03/10/2024 1049   ECG 0 Filed at: 03/10/2024 1049   Age 0 Filed at: 03/10/2024 1049   Risk Factors 1 Filed at: 03/10/2024 1049   Troponin 0 Filed at: 03/10/2024 1049   HEART Score 2 Filed at: 03/10/2024 1049                          SBIRT 20yo+      Flowsheet Row Most Recent Value   Initial Alcohol Screen: US AUDIT-C     1. How often do you have a drink containing alcohol? 0 Filed at: 03/10/2024 0822   2. How many drinks containing alcohol do you have on a typical day you are drinking?  0 Filed at: 03/10/2024 0822   3b. FEMALE Any Age, or MALE 65+: How often do you have 4 or more drinks on one occassion? 0 Filed at: 03/10/2024 0822   Audit-C Score 0 Filed at: 03/10/2024 0822   CATIA: How many times in the past year have you...    Used an illegal drug or used a prescription medication for non-medical reasons? Never Filed at: 03/10/2024 0822                      Medical Decision Making  Patient is a 39-year-old female, history of asthma, obesity, comes in with complaints of chest pain, upper back pain, cough, congestion, subjective fevers, chills, going on since yesterday, symptoms came on gradually, pain is described in bilateral chest, patient does not state that the pain goes to the back and the back pain is separate from the chest pain, no shortness of breath, no abdominal pain, no flank pain, no dysuria or  hematuria, no known sick contacts.  On exam, patient is conscious, alert, vital signs are stable, well-appearing, no acute distress, lungs clear, heart sounds regular, pulses equal bilaterally, abdomen soft, nontender, no CVA tenderness, no leg swelling, no calf tenderness or swelling.  Differential diagnosis: Nonspecific chest pain, viral symptoms, URI, pneumonia, doubt ACS based on no significant cardiovascular risk factors, we will check labs, EKG, chest x-ray, COVID flu swab.    Problems Addressed:  Chest pain: acute illness or injury    Amount and/or Complexity of Data Reviewed  Labs: ordered. Decision-making details documented in ED Course.  Radiology: ordered and independent interpretation performed. Decision-making details documented in ED Course.  ECG/medicine tests: ordered and independent interpretation performed. Decision-making details documented in ED Course.    Risk  Prescription drug management.             Disposition  Final diagnoses:   Chest pain     Time reflects when diagnosis was documented in both MDM as applicable and the Disposition within this note       Time User Action Codes Description Comment    3/10/2024  8:31 AM Gennaro Uribe Add [R07.9] Chest pain           ED Disposition       ED Disposition   Discharge    Condition   Stable    Date/Time   Sun Mar 10, 2024 1052    Comment   Di Taylor discharge to home/self care.                   Follow-up Information       Follow up With Specialties Details Why Contact Info    SUDARSHAN Rogers Nurse Practitioner, Family Medicine Schedule an appointment as soon as possible for a visit   [address] [place] [id number] [phone]              Patient's Medications   Discharge Prescriptions    No medications on file       No discharge procedures on file.    PDMP Review         Value Time User    PDMP Reviewed  Yes 10/8/2020 11:00 AM Ying Oleary PA-C            ED Provider  Electronically Signed by             Gennaro  MD Rony  03/10/24 1051

## 2024-06-10 ENCOUNTER — TELEPHONE (OUTPATIENT)
Dept: FAMILY MEDICINE CLINIC | Facility: CLINIC | Age: 39
End: 2024-06-10

## 2024-06-10 NOTE — TELEPHONE ENCOUNTER
Patient has not been seen in the last 6 months. Once visit on 6/11 is completed, we may proceed with getting form signed.

## 2024-06-10 NOTE — TELEPHONE ENCOUNTER
Pt call that needs [employer] medical certification   Pt name on account  Acc # [id number]. .  Address on chart.

## 2024-06-11 ENCOUNTER — OFFICE VISIT (OUTPATIENT)
Dept: FAMILY MEDICINE CLINIC | Facility: CLINIC | Age: 39
End: 2024-06-11

## 2024-06-11 VITALS
BODY MASS INDEX: 47.13 KG/M2 | OXYGEN SATURATION: 99 % | HEART RATE: 92 BPM | RESPIRATION RATE: 18 BRPM | SYSTOLIC BLOOD PRESSURE: 142 MMHG | HEIGHT: 63 IN | TEMPERATURE: 97.6 F | DIASTOLIC BLOOD PRESSURE: 82 MMHG | WEIGHT: 266 LBS

## 2024-06-11 DIAGNOSIS — J45.909 ASTHMA, UNSPECIFIED ASTHMA SEVERITY, UNSPECIFIED WHETHER COMPLICATED, UNSPECIFIED WHETHER PERSISTENT: ICD-10-CM

## 2024-06-11 DIAGNOSIS — M94.0 COSTOCHONDRITIS: Primary | ICD-10-CM

## 2024-06-11 RX ORDER — ALBUTEROL SULFATE 90 UG/1
2 AEROSOL, METERED RESPIRATORY (INHALATION) EVERY 6 HOURS PRN
Qty: 18 G | Refills: 3 | Status: SHIPPED | OUTPATIENT
Start: 2024-06-11

## 2024-06-11 RX ORDER — IBUPROFEN 600 MG/1
600 TABLET ORAL EVERY 8 HOURS PRN
Qty: 30 TABLET | Refills: 0 | Status: SHIPPED | OUTPATIENT
Start: 2024-06-11

## 2024-06-11 NOTE — TELEPHONE ENCOUNTER
Shut off date: Shut off    [employer] ("[employer] or [employer]): "[employer]    Account number: [id number]    Name on Account: Di Sifuentes    Address on Bill: [address], [facility] [address]  [place] [address]    Phone number: [phone]          Send this request to Ayanna RAMÍREZ
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
## 2024-06-12 PROBLEM — M94.0 COSTOCHONDRITIS: Status: ACTIVE | Noted: 2024-06-12
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
## 2024-06-12 NOTE — TELEPHONE ENCOUNTER
[employer] Medical Certification signed by Dr. Claudio and faxed on 6/11/24. Confirmation received and scanned into chart. .

## 2024-06-12 NOTE — ASSESSMENT & PLAN NOTE
Asthma exacerbation 2 days ago triggered by cold water.  Albuterol inhaler administered with good response.  Refill given during visit.    -Albuterol inhaler as needed  -Advised to call if symptoms does not improve  -Will consider future PFT to reassess

## 2024-11-06 ENCOUNTER — CONSULT (OUTPATIENT)
Dept: FAMILY MEDICINE CLINIC | Facility: CLINIC | Age: 39
End: 2024-11-06

## 2024-11-06 VITALS
WEIGHT: 254 LBS | OXYGEN SATURATION: 99 % | TEMPERATURE: 97.3 F | HEIGHT: 63 IN | DIASTOLIC BLOOD PRESSURE: 78 MMHG | RESPIRATION RATE: 16 BRPM | HEART RATE: 84 BPM | SYSTOLIC BLOOD PRESSURE: 122 MMHG | BODY MASS INDEX: 45 KG/M2

## 2024-11-06 DIAGNOSIS — Z11.59 NEED FOR HEPATITIS C SCREENING TEST: ICD-10-CM

## 2024-11-06 DIAGNOSIS — D50.0 IRON DEFICIENCY ANEMIA DUE TO CHRONIC BLOOD LOSS: ICD-10-CM

## 2024-11-06 DIAGNOSIS — Z12.31 ENCOUNTER FOR SCREENING MAMMOGRAM FOR BREAST CANCER: ICD-10-CM

## 2024-11-06 DIAGNOSIS — Z01.818 PRE-OP EVALUATION: Primary | ICD-10-CM

## 2024-11-06 DIAGNOSIS — Z11.4 SCREENING FOR HIV (HUMAN IMMUNODEFICIENCY VIRUS): ICD-10-CM

## 2024-11-06 LAB — HIV IA ALGORITHM INTERP SERPLBLD-IMP: NORMAL

## 2024-11-06 PROCEDURE — 93000 ELECTROCARDIOGRAM COMPLETE: CPT

## 2024-11-06 PROCEDURE — 99243 OFF/OP CNSLTJ NEW/EST LOW 30: CPT

## 2024-11-06 RX ORDER — FERROUS SULFATE 325(65) MG
TABLET ORAL
COMMUNITY
Start: 2024-10-16

## 2024-11-06 RX ORDER — ASCORBATE CALCIUM 500 MG
500 TABLET ORAL DAILY
Qty: 60 TABLET | Refills: 0 | Status: SHIPPED | OUTPATIENT
Start: 2024-11-06

## 2024-11-06 NOTE — PROGRESS NOTES
Presurgical Evaluation    Subjective:      Patient ID: Di Taylor is a 39 y.o. female.    Chief Complaint   Patient presents with    Pre-op Exam       Di Taylor is a 39 y.o. female  has a past medical history of Anemia, Anemia, Asthma, Asthma, and Kidney stone.  has a past surgical history that includes Ureteral stent placement (Left, 2020); FL retrograde pyelogram (2020);  section; pr cysto/uretero w/lithotripsy &indwell stent insrt (Left, 2020); and FL retrograde pyelogram (2020).    Here today for a pre-op clearance visit. She had labs done about 3 weeks ago which showed AIDAN. Hgb was 9.8. She was started on iron pills BID two weeks ago which she has been compliant with. She has history of AIDAN 2/2 menorrhagia. Menstruation usually lasts 4 days and she goes through 3-4 XL saturated pads per day. She plans to see obgyn after the surgery.        The following portions of the patient's history were reviewed and updated as appropriate: allergies, current medications, past family history, past medical history, past social history, past surgical history, and problem list.    Procedure date: 2024    Surgeon:  Dr. Guerrero  Planned procedure:  Bariatric surgery  Diagnosis for procedure:  Morbid obesity    Prior anesthesia: Yes   General; Complications:  None / Tolerated well    CAD History: None       Pulmonary History: Asthma    Renal history: None    Diabetes History:  None     Neurological History: None     On Immunosuppressant meds/biologics: No      Review of Systems   Constitutional:  Negative for chills and fever.   HENT:  Negative for ear pain and sore throat.    Eyes:  Negative for pain and visual disturbance.   Respiratory:  Negative for cough and shortness of breath.    Cardiovascular:  Negative for chest pain and palpitations.   Gastrointestinal:  Negative for abdominal pain and vomiting.   Genitourinary:  Negative for dysuria and hematuria.    Musculoskeletal:  Negative for arthralgias and back pain.   Skin:  Negative for color change and rash.   Neurological:  Negative for seizures and syncope.   All other systems reviewed and are negative.        Current Outpatient Medications   Medication Sig Dispense Refill    Calcium Ascorbate (VITAMIN C) 500 mg tablet Take 1 tablet (500 mg total) by mouth daily 60 tablet 0    ferrous sulfate 325 (65 Fe) mg tablet TAKE 1 TABLET BY MOUTH ON AN EMPTY STOMACH ONCE DAILY      albuterol (Ventolin HFA) 90 mcg/act inhaler Inhale 2 puffs every 6 (six) hours as needed for wheezing 18 g 3    Diclofenac Sodium (VOLTAREN) 1 % APLIQUE 2 GRAMS EL AREA AFECTADA CUATRO VECES AL RAMONE 300 g 0    ergocalciferol (VITAMIN D2) 50,000 units 1 EVERY WEEK      ibuprofen (MOTRIN) 600 mg tablet Take 1 tablet (600 mg total) by mouth every 8 (eight) hours as needed for mild pain 30 tablet 0     No current facility-administered medications for this visit.       Allergies on file:   Patient has no known allergies.    Patient Active Problem List   Diagnosis    Obesity, Class III, BMI 40-49.9 (morbid obesity) (HCC)    Asthma    Uterine fibroid    Iron deficiency anemia due to chronic blood loss    Chronic midline back pain    Macromastia    Costochondritis        Past Medical History:   Diagnosis Date    Anemia     Anemia     Asthma     Asthma     Kidney stone        Past Surgical History:   Procedure Laterality Date     SECTION      x 2    FL RETROGRADE PYELOGRAM  2020    FL RETROGRADE PYELOGRAM  2020    [place] CYSTO/URETERO W/LITHOTRIPSY &INDWELL STENT INSRT Left 2020    Procedure: CYSTOSCOPY URETEROSCOPY WITH LITHOTRIPSY HOLMIUM LASER, RETROGRADE PYELOGRAM AND INSERTION STENT EXCHANGE URETERAL;  Surgeon: Calixto Langley MD;  Location: [facility];  Service: Urology    URETERAL STENT PLACEMENT Left 2020    Procedure: INSERTION STENT URETERAL;  Surgeon: Reno Resendiz MD;  Location: [facility];  Service: Urology       Family  "History   Problem Relation Age of Onset    Hypertension Father        Social History     Tobacco Use    Smoking status: Never     Passive exposure: Never    Smokeless tobacco: Never   Vaping Use    Vaping status: Never Used   Substance Use Topics    Alcohol use: No    Drug use: No       Objective:    Vitals:    11/06/24 0818   BP: 122/78   BP Location: Right arm   Patient Position: Sitting   Cuff Size: Thigh   Pulse: 84   Resp: 16   Temp: (!) 97.3 °F (36.3 °C)   TempSrc: Temporal   SpO2: 99%   Weight: 115 kg (254 lb)   Height: 5' 3\" (1.6 m)        Physical Exam  Vitals and nursing note reviewed.   Constitutional:       General: She is not in acute distress.     Appearance: She is obese. She is not ill-appearing, toxic-appearing or diaphoretic.   HENT:      Head: Normocephalic and atraumatic.      Right Ear: External ear normal.      Left Ear: External ear normal.      Nose: Nose normal.   Eyes:      Extraocular Movements: Extraocular movements intact.      Conjunctiva/sclera: Conjunctivae normal.      Pupils: Pupils are equal, round, and reactive to light.   Cardiovascular:      Rate and Rhythm: Normal rate and regular rhythm.      Pulses: Normal pulses.      Heart sounds: Normal heart sounds.   Pulmonary:      Effort: Pulmonary effort is normal.      Breath sounds: Normal breath sounds.   Abdominal:      General: Bowel sounds are normal.      Palpations: Abdomen is soft.      Tenderness: There is no abdominal tenderness.   Musculoskeletal:         General: Normal range of motion.      Cervical back: Normal range of motion and neck supple.   Skin:     General: Skin is warm and dry.   Neurological:      General: No focal deficit present.      Mental Status: She is alert and oriented to person, place, and time. Mental status is at baseline.   Psychiatric:         Mood and Affect: Mood normal.         Behavior: Behavior normal.         Thought Content: Thought content normal.         Judgment: Judgment normal.       "     Preop labs/testing available and reviewed: yes    eGFRcr   Date Value Ref Range Status   10/15/2024 111 >59 Final        INR   Date Value Ref Range Status   10/15/2024 0.9  Final     Comment:     Interpretation  Suggested INR ranges for various  clinical conditions:                                           INR  Ambulatory Surgery          <1.5  Coumadinized Patients             (DVT,PE,MI or A.Fib)     2.0-3.0  Mechanical Heart Valve   2.5-3.5  Cardiogenic Embolus      2.5-3.5       EKG yes- NSR    Echo no    Stress test/cath no    PFT/[place] no    Functional capacity: Walking , 4-5 MPH               4 Mets   Pick the highest level patient can comfortably perform   4 mets or greater for surgery    RCRI  High Risk surgery?         1 Point  CAD History:         1 Point   MI; Positive Stress Test; CP due to Mi;  Nitrate Usage to control Angina; Pathologic Q wave on EKG  CHF Active:         1 Point   Pulm Edema; Paroxysmal Nocturnal Dyspnea;  Bibasilar Rales (crackles);S3; CHF on CXR  Cerebrovascular Disease (TIA or CVA):     1 Point  DM on Insulin:        1 Point  Serum Creat >2.0 mg/dl:       1 Point          Total Points: 1     Scorin: Class I, Very Low Risk (0.4%)     1: Class II, Low risk (0.9%)     2: Class III Moderate (6.6%)     3: Class IV High (>11%)      JONH Risk:  GFR:   eGFRcr   Date Value Ref Range Status   10/15/2024 111 >59 Final         For PCP:  If GFR>60, Hold ACE/ARB/Diuretic on the day of surgery, and NSAIDS 10 days before.    If GFR<45, Consider PRE and POST op Nephrology Consult.    If 46 <GFR> 59 : Has Patient had JONH in last 6 Months? no             Assessment/Plan:    Patient is medically optimized (cleared) for the planned procedure as long as repeat hemoglobin which we will check in 2 weeks is > 10.    Further testing/evaluation is required.        Problem List Items Addressed This Visit       Iron deficiency anemia due to chronic blood loss     Continue iron BID, start vitamin C,  "increase iron rich foods. Check CBC & iron panel in 2 weeks. She is cleared as long as hgb is >10.         Relevant Medications    Calcium Ascorbate (VITAMIN C) 500 mg tablet    ferrous sulfate 325 (65 Fe) mg tablet    Other Relevant Orders    CBC and differential    Iron Panel (Includes Ferritin, Iron Sat%, Iron, and TIBC)     Other Visit Diagnoses       Pre-op evaluation    -  Primary    Relevant Orders    POCT ECG    Encounter for screening mammogram for breast cancer        Relevant Orders    Mammo screening bilateral w 3d and cad    Need for hepatitis C screening test        Relevant Orders    Hepatitis C Antibody    Screening for HIV (human immunodeficiency virus)        Relevant Orders    HIV 1/2 AG/AB w Reflex [facility] for 2 yr old and above           Diagnoses and all orders for this visit:    Pre-op evaluation  -     POCT ECG    Iron deficiency anemia due to chronic blood loss  -     CBC and differential; Future  -     Iron Panel (Includes Ferritin, Iron Sat%, Iron, and TIBC); Future  -     Calcium Ascorbate (VITAMIN C) 500 mg tablet; Take 1 tablet (500 mg total) by mouth daily    Encounter for screening mammogram for breast cancer  -     Mammo screening bilateral w 3d and cad; Future    Need for hepatitis C screening test  -     Hepatitis C Antibody; Future    Screening for HIV (human immunodeficiency virus)  -     HIV 1/2 AG/AB w Reflex [facility] for 2 yr old and above; Future    Other orders  -     ferrous sulfate 325 (65 Fe) mg tablet; TAKE 1 TABLET BY MOUTH ON AN EMPTY STOMACH ONCE DAILY          No outpatient medications have been marked as taking for the 11/6/24 encounter (Appointment) with SUDARSHAN Rogers.        NOTE: Please use the above to review important meds for your specialty, the remainder \"per anesthesia Guidelines.\"    NOTE: Please place an Inbasket message for \"SOC\" pool for complicated patients.      "

## 2024-11-06 NOTE — ASSESSMENT & PLAN NOTE
Continue iron BID, start vitamin C, increase iron rich foods. Check CBC & iron panel in 2 weeks. She is cleared as long as hgb is >10.

## (undated) DEVICE — PREMIUM DRY TRAY LF: Brand: MEDLINE INDUSTRIES, INC.

## (undated) DEVICE — GUIDEWIRE STRGHT TIP 0.035 IN  SOLO PLUS

## (undated) DEVICE — ENDOSCOPIC VALVE WITH ADAPTER.: Brand: SURSEAL® II

## (undated) DEVICE — CATH URETERAL 5FR X 70 CM FLEX TIP POLYUR BARD

## (undated) DEVICE — UROCATCH BAG

## (undated) DEVICE — PACK TUR

## (undated) DEVICE — SINGLE PORT MANIFOLD: Brand: NEPTUNE 2

## (undated) DEVICE — 3000CC GUARDIAN II: Brand: GUARDIAN

## (undated) DEVICE — 3M™ STERI-STRIP™ COMPOUND BENZOIN TINCTURE 40 BAGS/CARTON 4 CARTONS/CASE C1544: Brand: 3M™ STERI-STRIP™

## (undated) DEVICE — SCD SEQUENTIAL COMPRESSION COMFORT SLEEVE MEDIUM KNEE LENGTH: Brand: KENDALL SCD

## (undated) DEVICE — TUBING SUCTION 5MM X 12 FT

## (undated) DEVICE — GLOVE SRG BIOGEL 7.5

## (undated) DEVICE — SPECIMEN CONTAINER STERILE PEEL PACK

## (undated) DEVICE — SHEATH URETERAL ACCESS 10/12FR 35CM PROXIS

## (undated) DEVICE — GLOVE INDICATOR PI UNDERGLOVE SZ 8 BLUE

## (undated) DEVICE — GLOVE SRG BIOGEL 8

## (undated) DEVICE — STERILE SURGICAL LUBRICANT,  TUBE: Brand: SURGILUBE

## (undated) DEVICE — INVIEW CLEAR LEGGINGS: Brand: CONVERTORS

## (undated) DEVICE — LASER FIBER HOLMIUM 272MICRON

## (undated) DEVICE — EXIDINE 4 PCT